# Patient Record
Sex: MALE | Race: WHITE | NOT HISPANIC OR LATINO | Employment: OTHER | URBAN - METROPOLITAN AREA
[De-identification: names, ages, dates, MRNs, and addresses within clinical notes are randomized per-mention and may not be internally consistent; named-entity substitution may affect disease eponyms.]

---

## 2023-10-26 ENCOUNTER — HOSPITAL ENCOUNTER (INPATIENT)
Facility: CLINIC | Age: 78
LOS: 11 days | Discharge: HOME HEALTH | DRG: 872 | End: 2023-11-06
Attending: EMERGENCY MEDICINE | Admitting: STUDENT IN AN ORGANIZED HEALTH CARE EDUCATION/TRAINING PROGRAM
Payer: MEDICARE

## 2023-10-26 ENCOUNTER — APPOINTMENT (OUTPATIENT)
Dept: GENERAL RADIOLOGY | Facility: CLINIC | Age: 78
DRG: 872 | End: 2023-10-26
Attending: EMERGENCY MEDICINE
Payer: MEDICARE

## 2023-10-26 DIAGNOSIS — F43.23 ADJUSTMENT REACTION WITH ANXIETY AND DEPRESSION: ICD-10-CM

## 2023-10-26 DIAGNOSIS — A49.9 UTI (URINARY TRACT INFECTION), BACTERIAL: ICD-10-CM

## 2023-10-26 DIAGNOSIS — N39.0 UTI (URINARY TRACT INFECTION), BACTERIAL: ICD-10-CM

## 2023-10-26 DIAGNOSIS — F02.80 ALZHEIMER'S DISEASE (H): ICD-10-CM

## 2023-10-26 DIAGNOSIS — J30.2 SEASONAL ALLERGIC RHINITIS, UNSPECIFIED TRIGGER: ICD-10-CM

## 2023-10-26 DIAGNOSIS — G30.9 ALZHEIMER'S DISEASE (H): ICD-10-CM

## 2023-10-26 DIAGNOSIS — L30.4 INTERTRIGO: Primary | ICD-10-CM

## 2023-10-26 DIAGNOSIS — R53.1 WEAKNESS GENERALIZED: ICD-10-CM

## 2023-10-26 LAB
ALBUMIN SERPL BCG-MCNC: 4 G/DL (ref 3.5–5.2)
ALBUMIN UR-MCNC: 100 MG/DL
ALP SERPL-CCNC: 68 U/L (ref 40–129)
ALT SERPL W P-5'-P-CCNC: 36 U/L (ref 0–70)
ANION GAP SERPL CALCULATED.3IONS-SCNC: 13 MMOL/L (ref 7–15)
APPEARANCE UR: ABNORMAL
AST SERPL W P-5'-P-CCNC: 104 U/L (ref 0–45)
BACTERIA #/AREA URNS HPF: ABNORMAL /HPF
BASOPHILS # BLD AUTO: 0 10E3/UL (ref 0–0.2)
BASOPHILS NFR BLD AUTO: 0 %
BILIRUB SERPL-MCNC: 1 MG/DL
BILIRUB UR QL STRIP: NEGATIVE
BUN SERPL-MCNC: 21.5 MG/DL (ref 8–23)
CALCIUM SERPL-MCNC: 8.9 MG/DL (ref 8.8–10.2)
CHLORIDE SERPL-SCNC: 102 MMOL/L (ref 98–107)
COLOR UR AUTO: YELLOW
CREAT SERPL-MCNC: 1.03 MG/DL (ref 0.67–1.17)
DEPRECATED HCO3 PLAS-SCNC: 20 MMOL/L (ref 22–29)
EGFRCR SERPLBLD CKD-EPI 2021: 74 ML/MIN/1.73M2
EOSINOPHIL # BLD AUTO: 0 10E3/UL (ref 0–0.7)
EOSINOPHIL NFR BLD AUTO: 0 %
ERYTHROCYTE [DISTWIDTH] IN BLOOD BY AUTOMATED COUNT: 13 % (ref 10–15)
FLUAV RNA SPEC QL NAA+PROBE: NEGATIVE
FLUBV RNA RESP QL NAA+PROBE: NEGATIVE
GLUCOSE SERPL-MCNC: 172 MG/DL (ref 70–99)
GLUCOSE UR STRIP-MCNC: NEGATIVE MG/DL
HCO3 BLDV-SCNC: 20 MMOL/L (ref 21–28)
HCT VFR BLD AUTO: 39.2 % (ref 40–53)
HGB BLD-MCNC: 13 G/DL (ref 13.3–17.7)
HGB UR QL STRIP: ABNORMAL
HOLD SPECIMEN: NORMAL
HYALINE CASTS: 5 /LPF
IMM GRANULOCYTES # BLD: 0.2 10E3/UL
IMM GRANULOCYTES NFR BLD: 1 %
KETONES UR STRIP-MCNC: ABNORMAL MG/DL
LACTATE BLD-SCNC: 3 MMOL/L
LACTATE SERPL-SCNC: 1 MMOL/L (ref 0.7–2)
LEUKOCYTE ESTERASE UR QL STRIP: ABNORMAL
LYMPHOCYTES # BLD AUTO: 1.2 10E3/UL (ref 0.8–5.3)
LYMPHOCYTES NFR BLD AUTO: 6 %
MCH RBC QN AUTO: 31.5 PG (ref 26.5–33)
MCHC RBC AUTO-ENTMCNC: 33.2 G/DL (ref 31.5–36.5)
MCV RBC AUTO: 95 FL (ref 78–100)
MONOCYTES # BLD AUTO: 1.4 10E3/UL (ref 0–1.3)
MONOCYTES NFR BLD AUTO: 7 %
MUCOUS THREADS #/AREA URNS LPF: PRESENT /LPF
NEUTROPHILS # BLD AUTO: 16.6 10E3/UL (ref 1.6–8.3)
NEUTROPHILS NFR BLD AUTO: 86 %
NITRATE UR QL: NEGATIVE
NRBC # BLD AUTO: 0 10E3/UL
NRBC BLD AUTO-RTO: 0 /100
PCO2 BLDV: 28 MM HG (ref 40–50)
PH BLDV: 7.46 [PH] (ref 7.32–7.43)
PH UR STRIP: 6 [PH] (ref 5–7)
PLATELET # BLD AUTO: 155 10E3/UL (ref 150–450)
PO2 BLDV: 41 MM HG (ref 25–47)
POTASSIUM SERPL-SCNC: 3.8 MMOL/L (ref 3.4–5.3)
PROT SERPL-MCNC: 6.6 G/DL (ref 6.4–8.3)
RBC # BLD AUTO: 4.13 10E6/UL (ref 4.4–5.9)
RBC URINE: 8 /HPF
RSV RNA SPEC NAA+PROBE: NEGATIVE
SAO2 % BLDV: 80 % (ref 94–100)
SARS-COV-2 RNA RESP QL NAA+PROBE: NEGATIVE
SODIUM SERPL-SCNC: 135 MMOL/L (ref 135–145)
SP GR UR STRIP: 1.02 (ref 1–1.03)
SPERM #/AREA URNS HPF: PRESENT /HPF
SQUAMOUS EPITHELIAL: 1 /HPF
UROBILINOGEN UR STRIP-MCNC: NORMAL MG/DL
WBC # BLD AUTO: 19.5 10E3/UL (ref 4–11)
WBC URINE: >182 /HPF

## 2023-10-26 PROCEDURE — 250N000011 HC RX IP 250 OP 636: Mod: JZ | Performed by: EMERGENCY MEDICINE

## 2023-10-26 PROCEDURE — 80053 COMPREHEN METABOLIC PANEL: CPT | Performed by: EMERGENCY MEDICINE

## 2023-10-26 PROCEDURE — 87637 SARSCOV2&INF A&B&RSV AMP PRB: CPT | Performed by: EMERGENCY MEDICINE

## 2023-10-26 PROCEDURE — 99285 EMERGENCY DEPT VISIT HI MDM: CPT | Mod: 25

## 2023-10-26 PROCEDURE — 120N000001 HC R&B MED SURG/OB

## 2023-10-26 PROCEDURE — 83605 ASSAY OF LACTIC ACID: CPT

## 2023-10-26 PROCEDURE — 93005 ELECTROCARDIOGRAM TRACING: CPT

## 2023-10-26 PROCEDURE — 85025 COMPLETE CBC W/AUTO DIFF WBC: CPT | Performed by: EMERGENCY MEDICINE

## 2023-10-26 PROCEDURE — 250N000013 HC RX MED GY IP 250 OP 250 PS 637: Performed by: STUDENT IN AN ORGANIZED HEALTH CARE EDUCATION/TRAINING PROGRAM

## 2023-10-26 PROCEDURE — 36415 COLL VENOUS BLD VENIPUNCTURE: CPT

## 2023-10-26 PROCEDURE — 81001 URINALYSIS AUTO W/SCOPE: CPT | Performed by: EMERGENCY MEDICINE

## 2023-10-26 PROCEDURE — 87040 BLOOD CULTURE FOR BACTERIA: CPT | Performed by: EMERGENCY MEDICINE

## 2023-10-26 PROCEDURE — 36415 COLL VENOUS BLD VENIPUNCTURE: CPT | Performed by: EMERGENCY MEDICINE

## 2023-10-26 PROCEDURE — 82803 BLOOD GASES ANY COMBINATION: CPT

## 2023-10-26 PROCEDURE — 87086 URINE CULTURE/COLONY COUNT: CPT | Performed by: EMERGENCY MEDICINE

## 2023-10-26 PROCEDURE — 250N000013 HC RX MED GY IP 250 OP 250 PS 637: Performed by: EMERGENCY MEDICINE

## 2023-10-26 PROCEDURE — 250N000011 HC RX IP 250 OP 636: Performed by: STUDENT IN AN ORGANIZED HEALTH CARE EDUCATION/TRAINING PROGRAM

## 2023-10-26 PROCEDURE — 99223 1ST HOSP IP/OBS HIGH 75: CPT | Mod: AI | Performed by: STUDENT IN AN ORGANIZED HEALTH CARE EDUCATION/TRAINING PROGRAM

## 2023-10-26 PROCEDURE — 258N000003 HC RX IP 258 OP 636: Performed by: STUDENT IN AN ORGANIZED HEALTH CARE EDUCATION/TRAINING PROGRAM

## 2023-10-26 PROCEDURE — 71046 X-RAY EXAM CHEST 2 VIEWS: CPT

## 2023-10-26 RX ORDER — ACETAMINOPHEN 325 MG/1
975 TABLET ORAL ONCE
Status: COMPLETED | OUTPATIENT
Start: 2023-10-26 | End: 2023-10-26

## 2023-10-26 RX ORDER — SODIUM CHLORIDE, SODIUM LACTATE, POTASSIUM CHLORIDE, CALCIUM CHLORIDE 600; 310; 30; 20 MG/100ML; MG/100ML; MG/100ML; MG/100ML
INJECTION, SOLUTION INTRAVENOUS CONTINUOUS
Status: DISCONTINUED | OUTPATIENT
Start: 2023-10-26 | End: 2023-10-27

## 2023-10-26 RX ORDER — ONDANSETRON 4 MG/1
4 TABLET, ORALLY DISINTEGRATING ORAL EVERY 6 HOURS PRN
Status: DISCONTINUED | OUTPATIENT
Start: 2023-10-26 | End: 2023-11-06 | Stop reason: HOSPADM

## 2023-10-26 RX ORDER — AMOXICILLIN 250 MG
2 CAPSULE ORAL 2 TIMES DAILY PRN
Status: DISCONTINUED | OUTPATIENT
Start: 2023-10-26 | End: 2023-11-06 | Stop reason: HOSPADM

## 2023-10-26 RX ORDER — QUETIAPINE FUMARATE 25 MG/1
25 TABLET, FILM COATED ORAL AT BEDTIME
Status: ON HOLD | COMMUNITY
End: 2023-11-06

## 2023-10-26 RX ORDER — CETIRIZINE HYDROCHLORIDE 10 MG/1
10 TABLET ORAL DAILY
Status: ON HOLD | COMMUNITY
End: 2023-11-06

## 2023-10-26 RX ORDER — CEFTRIAXONE 1 G/1
1 INJECTION, POWDER, FOR SOLUTION INTRAMUSCULAR; INTRAVENOUS EVERY 24 HOURS
Status: DISCONTINUED | OUTPATIENT
Start: 2023-10-27 | End: 2023-10-27

## 2023-10-26 RX ORDER — AMOXICILLIN 250 MG
1 CAPSULE ORAL 2 TIMES DAILY PRN
Status: DISCONTINUED | OUTPATIENT
Start: 2023-10-26 | End: 2023-11-06 | Stop reason: HOSPADM

## 2023-10-26 RX ORDER — HEPARIN SODIUM 5000 [USP'U]/.5ML
5000 INJECTION, SOLUTION INTRAVENOUS; SUBCUTANEOUS EVERY 12 HOURS
Status: DISCONTINUED | OUTPATIENT
Start: 2023-10-26 | End: 2023-11-06 | Stop reason: HOSPADM

## 2023-10-26 RX ORDER — CEFTRIAXONE 1 G/1
1 INJECTION, POWDER, FOR SOLUTION INTRAMUSCULAR; INTRAVENOUS ONCE
Status: COMPLETED | OUTPATIENT
Start: 2023-10-26 | End: 2023-10-26

## 2023-10-26 RX ORDER — SODIUM CHLORIDE, SODIUM LACTATE, POTASSIUM CHLORIDE, CALCIUM CHLORIDE 600; 310; 30; 20 MG/100ML; MG/100ML; MG/100ML; MG/100ML
INJECTION, SOLUTION INTRAVENOUS CONTINUOUS
Status: DISCONTINUED | OUTPATIENT
Start: 2023-10-26 | End: 2023-10-26

## 2023-10-26 RX ORDER — QUETIAPINE FUMARATE 25 MG/1
25 TABLET, FILM COATED ORAL 2 TIMES DAILY
Status: DISCONTINUED | OUTPATIENT
Start: 2023-10-26 | End: 2023-10-27

## 2023-10-26 RX ORDER — ACETAMINOPHEN 650 MG/1
650 SUPPOSITORY RECTAL EVERY 6 HOURS PRN
Status: DISCONTINUED | OUTPATIENT
Start: 2023-10-26 | End: 2023-11-06 | Stop reason: HOSPADM

## 2023-10-26 RX ORDER — ACETAMINOPHEN 325 MG/1
650 TABLET ORAL EVERY 6 HOURS PRN
Status: DISCONTINUED | OUTPATIENT
Start: 2023-10-26 | End: 2023-11-06 | Stop reason: HOSPADM

## 2023-10-26 RX ORDER — ONDANSETRON 2 MG/ML
4 INJECTION INTRAMUSCULAR; INTRAVENOUS EVERY 6 HOURS PRN
Status: DISCONTINUED | OUTPATIENT
Start: 2023-10-26 | End: 2023-11-06 | Stop reason: HOSPADM

## 2023-10-26 RX ADMIN — CEFTRIAXONE 1 G: 1 INJECTION, POWDER, FOR SOLUTION INTRAMUSCULAR; INTRAVENOUS at 21:00

## 2023-10-26 RX ADMIN — ACETAMINOPHEN 975 MG: 325 TABLET, FILM COATED ORAL at 19:02

## 2023-10-26 RX ADMIN — QUETIAPINE FUMARATE 25 MG: 25 TABLET ORAL at 22:08

## 2023-10-26 RX ADMIN — SODIUM CHLORIDE 1000 ML: 9 INJECTION, SOLUTION INTRAVENOUS at 23:54

## 2023-10-26 RX ADMIN — HEPARIN SODIUM 5000 UNITS: 5000 INJECTION, SOLUTION INTRAVENOUS; SUBCUTANEOUS at 22:08

## 2023-10-26 RX ADMIN — SODIUM CHLORIDE 1000 ML: 9 INJECTION, SOLUTION INTRAVENOUS at 21:49

## 2023-10-26 ASSESSMENT — ACTIVITIES OF DAILY LIVING (ADL)
ADLS_ACUITY_SCORE: 35
ADLS_ACUITY_SCORE: 35
ADLS_ACUITY_SCORE: 41

## 2023-10-26 NOTE — LETTER
I will send progress notes in a separate fax.      DISHA Flores, [facility]  Inpatient Care Coordination  [facility]  [phone]

## 2023-10-26 NOTE — ED TRIAGE NOTES
Arrives via EMS for weakness possible UTI pt febrile hx dementia family was going to bring him in but had trouble getting him into the car and called ems. Pt denies pain. Alert to self. VSS. Fever 101.9     Triage Assessment (Adult)       Row Name 10/26/23 2904          Triage Assessment    Airway WDL WDL     Additional Documentation [place] Coma Scale (Group)        Respiratory WDL    Respiratory WDL WDL        Cognitive/Neuro/Behavioral WDL    Cognitive/Neuro/Behavioral WDL orientation     Orientation disoriented to;time;place;situation        [place] Coma Scale    Best Eye Response 4-->(E4) spontaneous     Best Motor Response 6-->(M6) obeys commands     Best Verbal Response 4-->(V4) confused     [place] Coma Scale Score 14

## 2023-10-26 NOTE — ED PROVIDER NOTES
History     Chief Complaint:  Altered Mental Status     The history is provided by the patient and the EMS personnel. The history is limited by the condition of the patient.      Jamie Meyer is a 77 year old male with history of Alzheimer's disease who presents to the ED via EMS for evaluation of altered mental status. RN reports Jamie' family was concerned due to increasing confusion over the last couple of days and intended to present to the ED via private car but had difficulty getting the patient into the car, prompting them to call EMS. Family notes he slipped out of a chair last night. O2 90% on room air. Febrile upon ED arrival at 101.9. Jamie denies pain or bowel or bladder symptoms.     Independent Historian:   RN per EMS    Review of External Notes:       Medications:    Zoloft  Seroquel    Past Medical History:    Late onset Alzheimer's disease with behavioral disturbance  Dyslipidemia    Past Surgical History:    Hernia repair  Prostate surgery    Physical Exam   Patient Vitals for the past 24 hrs:   BP Temp Temp src Pulse Resp SpO2   10/26/23 1920 -- -- -- 92 -- 92 %   10/26/23 1902 -- 99.4  F (37.4  C) -- -- -- 93 %   10/26/23 1821 -- -- -- 114 -- --   10/26/23 1813 128/75 (!) 101.9  F (38.8  C) Oral 65 20 90 %      Physical Exam  Vitals reviewed.   Cardiovascular:      Rate and Rhythm: Normal rate.   Pulmonary:      Effort: Pulmonary effort is normal.   Abdominal:      Palpations: Abdomen is soft.   Musculoskeletal:      Cervical back: Normal range of motion.   Skin:     General: Skin is warm.   Neurological:      Mental Status: He is alert. Mental status is at baseline.      GCS: GCS eye subscore is 4. GCS verbal subscore is 4. GCS motor subscore is 6.   Psychiatric:         Mood and Affect: Mood normal.           Emergency Department Course   ECG  ECG taken at 1813, ECG read at 1816  Sinus rhythm with frequent premature ventricular complexes  Anterior infarct, age undetermined  Abnormal ECG    Rate 99 bpm. NM interval 198 ms. QRS duration 112 ms. QT/QTc 380/487 ms. P-R-T axes 67 1 76.     Imaging:  CT Abdomen Pelvis w/o Contrast   Final Result   IMPRESSION:    1.  No hydronephrosis or urolithiasis.   2.  No acute process demonstrated.         Chest XR,  PA & LAT   Final Result   IMPRESSION: Enlargement of the cardiac silhouette. Bibasilar opacities, differential includes atelectasis and developing pneumonia. Possible small bilateral pleural effusions. No pneumothorax. No acute bony abnormality.         Report per radiology    Laboratory:  Labs Ordered and Resulted from Time of ED Arrival to Time of ED Departure   ROUTINE UA WITH MICROSCOPIC REFLEX TO CULTURE - Abnormal       Result Value    Color Urine Yellow      Appearance Urine Slightly Cloudy (*)     Glucose Urine Negative      Bilirubin Urine Negative      Ketones Urine Trace (*)     Specific Gravity Urine 1.024      Blood Urine Large (*)     pH Urine 6.0      Protein Albumin Urine 100 (*)     Urobilinogen Urine Normal      Nitrite Urine Negative      Leukocyte Esterase Urine Large (*)     Bacteria Urine Many (*)     Mucus Urine Present (*)     Sperm Urine Present (*)     RBC Urine 8 (*)     WBC Urine >182 (*)     Squamous Epithelials Urine 1      Hyaline Casts Urine 5 (*)    COMPREHENSIVE METABOLIC PANEL - Abnormal    Sodium 135      Potassium 3.8      Carbon Dioxide (CO2) 20 (*)     Anion Gap 13      Urea Nitrogen 21.5      Creatinine 1.03      GFR Estimate 74      Calcium 8.9      Chloride 102      Glucose 172 (*)     Alkaline Phosphatase 68       (*)     ALT 36      Protein Total 6.6      Albumin 4.0      Bilirubin Total 1.0     CBC WITH PLATELETS AND DIFFERENTIAL - Abnormal    WBC Count 19.5 (*)     RBC Count 4.13 (*)     Hemoglobin 13.0 (*)     Hematocrit 39.2 (*)     MCV 95      MCH 31.5      MCHC 33.2      RDW 13.0      Platelet Count 155      % Neutrophils 86      % Lymphocytes 6      % Monocytes 7      % Eosinophils 0      %  Basophils 0      % Immature Granulocytes 1      NRBCs per 100 WBC 0      Absolute Neutrophils 16.6 (*)     Absolute Lymphocytes 1.2      Absolute Monocytes 1.4 (*)     Absolute Eosinophils 0.0      Absolute Basophils 0.0      Absolute Immature Granulocytes 0.2      Absolute NRBCs 0.0     ISTAT GASES LACTATE VENOUS POCT - Abnormal    Lactic Acid POCT 3.0 (*)     Bicarbonate Venous POCT 20 (*)     O2 Sat, Venous POCT 80 (*)     pCO2 Venous POCT 28 (*)     pH Venous POCT 7.46 (*)     pO2 Venous POCT 41     INFLUENZA A/B, RSV, & SARS-COV2 PCR - Normal    Influenza A PCR Negative      Influenza B PCR Negative      RSV PCR Negative      SARS CoV2 PCR Negative     BLOOD CULTURE   URINE CULTURE     Procedures   None    Emergency Department Course & Assessments:    Interventions:  Medications   cefTRIAXone (ROCEPHIN) 1 g vial to attach to  mL bag for ADULTS or NS 50 mL bag for PEDS (has no administration in time range)   acetaminophen (TYLENOL) tablet 975 mg (975 mg Oral $Given 10/26/23 1902)     Assessments:  1818 I obtained history and examined the patient as noted above.  1921 I rechecked the patient and explained findings. I spoke with the patient's family as well.    Independent Interpretation (X-rays, CTs, rhythm strip):  None    Consultations/Discussion of Management or Tests:  1947 I spoke with Leana Gamez PA-C for Dr. Carmelo Mcclure DO of the hospitalist team regarding the patient, who accepted the patient for admission.     Social Determinants of Health affecting care:   None    Disposition:  The patient was admitted to the hospital under the care of Dr. Mcclure.     Impression & Plan      Medical Decision Making:  Patient is a 77-year-old male who presents with fever.  And weakness.  Intermittent confusion but has Alzheimer's.  Family notes increased weakness and inability to get out of bed.  Due to infectious encephalopathy and generalized weakness requires admission.  Lab work suggest UTI as the source  blood culture and urine culture pending at the time of admission.    Diagnosis:    ICD-10-CM    1. Weakness generalized  R53.1       2. UTI (urinary tract infection), bacterial  N39.0     A49.9       3. Alzheimer's disease (H)  G30.9     F02.80           Discharge Medications:  New Prescriptions    No medications on file        Scribe Disclosure:  Anton APONTE Hailie, am serving as a scribe at 6:25 PM on 10/26/2023 to document services personally performed by Maycol Briones MD based on my observations and the provider's statements to me.   10/26/2023   Maycol Briones MD Goodman, Brian Samuel, MD  10/29/23 2149

## 2023-10-26 NOTE — LETTER
DISHA Flores, [facility]  Inpatient Care Coordination  [facility]  [phone]

## 2023-10-26 NOTE — LETTER
DISHA Flores, [facility]  Inpatient Care Coordination  [facility]  [phone]

## 2023-10-27 ENCOUNTER — APPOINTMENT (OUTPATIENT)
Dept: PHYSICAL THERAPY | Facility: CLINIC | Age: 78
DRG: 872 | End: 2023-10-27
Attending: STUDENT IN AN ORGANIZED HEALTH CARE EDUCATION/TRAINING PROGRAM
Payer: MEDICARE

## 2023-10-27 LAB
ANION GAP SERPL CALCULATED.3IONS-SCNC: 9 MMOL/L (ref 7–15)
ATRIAL RATE - MUSE: 99 BPM
BUN SERPL-MCNC: 20.6 MG/DL (ref 8–23)
CALCIUM SERPL-MCNC: 8 MG/DL (ref 8.8–10.2)
CHLORIDE SERPL-SCNC: 107 MMOL/L (ref 98–107)
CREAT SERPL-MCNC: 0.9 MG/DL (ref 0.67–1.17)
DEPRECATED HCO3 PLAS-SCNC: 21 MMOL/L (ref 22–29)
DIASTOLIC BLOOD PRESSURE - MUSE: NORMAL MMHG
EGFRCR SERPLBLD CKD-EPI 2021: 87 ML/MIN/1.73M2
ERYTHROCYTE [DISTWIDTH] IN BLOOD BY AUTOMATED COUNT: 13 % (ref 10–15)
GLUCOSE SERPL-MCNC: 136 MG/DL (ref 70–99)
HCT VFR BLD AUTO: 35.5 % (ref 40–53)
HGB BLD-MCNC: 11.7 G/DL (ref 13.3–17.7)
INTERPRETATION ECG - MUSE: NORMAL
MCH RBC QN AUTO: 31.3 PG (ref 26.5–33)
MCHC RBC AUTO-ENTMCNC: 33 G/DL (ref 31.5–36.5)
MCV RBC AUTO: 95 FL (ref 78–100)
P AXIS - MUSE: 67 DEGREES
PLATELET # BLD AUTO: 142 10E3/UL (ref 150–450)
POTASSIUM SERPL-SCNC: 3.9 MMOL/L (ref 3.4–5.3)
PR INTERVAL - MUSE: 198 MS
QRS DURATION - MUSE: 112 MS
QT - MUSE: 380 MS
QTC - MUSE: 487 MS
R AXIS - MUSE: 1 DEGREES
RBC # BLD AUTO: 3.74 10E6/UL (ref 4.4–5.9)
SODIUM SERPL-SCNC: 137 MMOL/L (ref 135–145)
SYSTOLIC BLOOD PRESSURE - MUSE: NORMAL MMHG
T AXIS - MUSE: 76 DEGREES
VENTRICULAR RATE- MUSE: 99 BPM
WBC # BLD AUTO: 14.5 10E3/UL (ref 4–11)

## 2023-10-27 PROCEDURE — 250N000013 HC RX MED GY IP 250 OP 250 PS 637: Performed by: STUDENT IN AN ORGANIZED HEALTH CARE EDUCATION/TRAINING PROGRAM

## 2023-10-27 PROCEDURE — 97162 PT EVAL MOD COMPLEX 30 MIN: CPT | Mod: GP

## 2023-10-27 PROCEDURE — 97530 THERAPEUTIC ACTIVITIES: CPT | Mod: GP

## 2023-10-27 PROCEDURE — 250N000011 HC RX IP 250 OP 636: Performed by: STUDENT IN AN ORGANIZED HEALTH CARE EDUCATION/TRAINING PROGRAM

## 2023-10-27 PROCEDURE — 85027 COMPLETE CBC AUTOMATED: CPT | Performed by: STUDENT IN AN ORGANIZED HEALTH CARE EDUCATION/TRAINING PROGRAM

## 2023-10-27 PROCEDURE — 250N000011 HC RX IP 250 OP 636: Mod: JZ | Performed by: INTERNAL MEDICINE

## 2023-10-27 PROCEDURE — 80048 BASIC METABOLIC PNL TOTAL CA: CPT | Performed by: STUDENT IN AN ORGANIZED HEALTH CARE EDUCATION/TRAINING PROGRAM

## 2023-10-27 PROCEDURE — 258N000003 HC RX IP 258 OP 636: Performed by: STUDENT IN AN ORGANIZED HEALTH CARE EDUCATION/TRAINING PROGRAM

## 2023-10-27 PROCEDURE — 250N000013 HC RX MED GY IP 250 OP 250 PS 637: Performed by: INTERNAL MEDICINE

## 2023-10-27 PROCEDURE — 99233 SBSQ HOSP IP/OBS HIGH 50: CPT | Performed by: INTERNAL MEDICINE

## 2023-10-27 PROCEDURE — 120N000001 HC R&B MED SURG/OB

## 2023-10-27 PROCEDURE — 36415 COLL VENOUS BLD VENIPUNCTURE: CPT | Performed by: STUDENT IN AN ORGANIZED HEALTH CARE EDUCATION/TRAINING PROGRAM

## 2023-10-27 RX ORDER — QUETIAPINE FUMARATE 25 MG/1
25 TABLET, FILM COATED ORAL EVERY 12 HOURS PRN
Status: DISCONTINUED | OUTPATIENT
Start: 2023-10-27 | End: 2023-11-06 | Stop reason: HOSPADM

## 2023-10-27 RX ORDER — ENOXAPARIN SODIUM 100 MG/ML
40 INJECTION SUBCUTANEOUS EVERY 24 HOURS
Status: DISCONTINUED | OUTPATIENT
Start: 2023-10-27 | End: 2023-10-27

## 2023-10-27 RX ORDER — CEFTRIAXONE 2 G/1
2 INJECTION, POWDER, FOR SOLUTION INTRAMUSCULAR; INTRAVENOUS EVERY 24 HOURS
Status: DISCONTINUED | OUTPATIENT
Start: 2023-10-27 | End: 2023-11-05

## 2023-10-27 RX ORDER — QUETIAPINE FUMARATE 25 MG/1
25 TABLET, FILM COATED ORAL AT BEDTIME
Status: DISCONTINUED | OUTPATIENT
Start: 2023-10-27 | End: 2023-11-01

## 2023-10-27 RX ADMIN — HEPARIN SODIUM 5000 UNITS: 5000 INJECTION, SOLUTION INTRAVENOUS; SUBCUTANEOUS at 10:19

## 2023-10-27 RX ADMIN — QUETIAPINE FUMARATE 25 MG: 25 TABLET ORAL at 09:01

## 2023-10-27 RX ADMIN — HEPARIN SODIUM 5000 UNITS: 5000 INJECTION, SOLUTION INTRAVENOUS; SUBCUTANEOUS at 22:45

## 2023-10-27 RX ADMIN — QUETIAPINE FUMARATE 25 MG: 25 TABLET ORAL at 22:48

## 2023-10-27 RX ADMIN — CEFTRIAXONE 2 G: 2 INJECTION, POWDER, FOR SOLUTION INTRAMUSCULAR; INTRAVENOUS at 15:26

## 2023-10-27 RX ADMIN — QUETIAPINE FUMARATE 25 MG: 25 TABLET ORAL at 15:58

## 2023-10-27 RX ADMIN — SODIUM CHLORIDE, POTASSIUM CHLORIDE, SODIUM LACTATE AND CALCIUM CHLORIDE: 600; 310; 30; 20 INJECTION, SOLUTION INTRAVENOUS at 01:59

## 2023-10-27 ASSESSMENT — ACTIVITIES OF DAILY LIVING (ADL)
ADLS_ACUITY_SCORE: 47
ADLS_ACUITY_SCORE: 43
ADLS_ACUITY_SCORE: 41
ADLS_ACUITY_SCORE: 47
ADLS_ACUITY_SCORE: 41
ADLS_ACUITY_SCORE: 41
ADLS_ACUITY_SCORE: 47
ADLS_ACUITY_SCORE: 41

## 2023-10-27 NOTE — PLAN OF CARE
Goal Outcome Evaluation:      Plan of Care Reviewed With: patient    Overall Patient Progress: improving    Pt arrived to the unit via cart. Pt Alert oriented to self. Pleasantly confused. VSS RA. Denies pain. A febrile this shift. Received 2l of IVF bolus. LR running as MIVF at 100ml/hr. Pt incontinent of bladder PVR: 344ml straight cathed pt for 350ml x1 this shift. External catheter place. Pt on the K protocol lab scheduled this am. Lactic acid gone down to 1.0. receiving iv rocephin. Redness to bilateral eye noted. Generalized weakness present pt not out of bed this shift. No BM noted this shift.

## 2023-10-27 NOTE — PHARMACY-ADMISSION MEDICATION HISTORY
Pharmacist Admission Medication History    Admission medication history is complete. The information provided in this note is only as accurate as the sources available at the time of the update.    Information Source(s):  Spouse (Jackie)  via phone    Changes made to PTA medication list:  Added: all  Deleted: None  Changed: None    Prior to Admission medications    Medication Sig Last Dose Taking? Auth Provider Long Term End Date   cetirizine (ZYRTEC) 10 MG tablet Take 10 mg by mouth daily 10/26/2023 at am Yes Unknown, Entered By History     QUEtiapine (SEROQUEL) 25 MG tablet Take 25 mg by mouth at bedtime 10/25/2023 Yes Unknown, Entered By History     sertraline (ZOLOFT) 50 MG tablet Take 50 mg by mouth daily 10/26/2023 at am Yes Unknown, Entered By History Yes        Allergies reviewed with patient and updates made in EHR: no    Medication History Completed By: Niall Kan RPH 10/26/2023 11:02 PM    Pharmacy reviewed prior to admission med list from pre-admitting rn.

## 2023-10-27 NOTE — PLAN OF CARE
Goal Outcome Evaluation:      Plan of Care Reviewed With: spouse    Overall Patient Progress: no changeOverall Patient Progress: no change     Admitting Diagnosis: AMS, UTI Date 10/26/23  PMH: Alzheimer  Orientation: A/Ox1 (self)    Vitals/Tele: Stable  Resp: LS Clear/ diminished RA    Pain: Denies  IV Access/drains: PIV x 2 SL  Diet:Regular needs help ordering and feeding  Labs/ Glucose: K+ 3.9 Redraw in AM. WBC 14.5 Hgb 11.7  Mobility:    GI/: Denies Nausea, vomiting, or abd pain. LBM Unknown  Voiding Incontinent of urine. Tried external cath but patient keep pulling them off. Wife stated that he normally wears pullup and this is why he is picking at things. Place brief on patient is more comfortable   Wound/Skin: Scatted bruising    Consults:PT, OT SW   Summary/Discharge Plan:Continue with IV Abx. Patient lives with his wife and is the primary care taker. PRN seroquel for agitation given.  Continue to monitor        See Flow sheets for assessment

## 2023-10-27 NOTE — ED NOTES
[facility]  ED Nurse Handoff Report    ED Chief complaint: Altered Mental Status  . ED Diagnosis:   Final diagnoses:   Weakness generalized   UTI (urinary tract infection), bacterial   Alzheimer's disease (H)       Allergies: No Known Allergies    Code Status: Full Code - no code status on file     Activity level - Baseline/Home:  ambulatory around the house per family - chaidez   Activity Level - Current:   assist of 1.   Lift room needed: No.   Bariatric: No   Needed: No   Isolation:special precaution .   Infection: Not Applicable.     Respiratory status: Room air    Vital Signs (within 30 minutes):   Vitals:    10/26/23 1821 10/26/23 1902 10/26/23 1920 10/26/23 2040   BP:    100/74   Pulse: 114  92 63   Resp:       Temp:  99.4  F (37.4  C)     TempSrc:       SpO2:  93% 92% 93%       Cardiac Rhythm:  ,      Pain level:    Patient confused: Yes. - baseline alzheimer   Patient Falls Risk: assistive device/personal items within reach, activity supervised, mobility aid in reach, room door open, and toileting schedule implemented, arm band inplaced.   Elimination Status: Has voided - incontinent     Patient Report - Initial Complaint: weakness .   Focused Assessment: The history is provided by the patient and the EMS personnel. The history is limited by the condition of the patient.      Jamie Meyer is a 77 year old male with history of Alzheimer's disease who presents to the ED via EMS for evaluation of altered mental status. RN reports Jamie' family was concerned due to increasing confusion over the last couple of days and intended to present to the ED via private car but had difficulty getting the patient into the car, prompting them to call EMS. Family notes he slipped out of a chair last night. O2 90% on room air. Febrile upon ED arrival at 101.9. Jamie denies pain or bowel or bladder symptoms.      Abnormal Results:   Labs Ordered and Resulted from Time of ED Arrival to Time  of ED Departure   ROUTINE UA WITH MICROSCOPIC REFLEX TO CULTURE - Abnormal       Result Value    Color Urine Yellow      Appearance Urine Slightly Cloudy (*)     Glucose Urine Negative      Bilirubin Urine Negative      Ketones Urine Trace (*)     Specific Gravity Urine 1.024      Blood Urine Large (*)     pH Urine 6.0      Protein Albumin Urine 100 (*)     Urobilinogen Urine Normal      Nitrite Urine Negative      Leukocyte Esterase Urine Large (*)     Bacteria Urine Many (*)     Mucus Urine Present (*)     Sperm Urine Present (*)     RBC Urine 8 (*)     WBC Urine >182 (*)     Squamous Epithelials Urine 1      Hyaline Casts Urine 5 (*)    COMPREHENSIVE METABOLIC PANEL - Abnormal    Sodium 135      Potassium 3.8      Carbon Dioxide (CO2) 20 (*)     Anion Gap 13      Urea Nitrogen 21.5      Creatinine 1.03      GFR Estimate 74      Calcium 8.9      Chloride 102      Glucose 172 (*)     Alkaline Phosphatase 68       (*)     ALT 36      Protein Total 6.6      Albumin 4.0      Bilirubin Total 1.0     CBC WITH PLATELETS AND DIFFERENTIAL - Abnormal    WBC Count 19.5 (*)     RBC Count 4.13 (*)     Hemoglobin 13.0 (*)     Hematocrit 39.2 (*)     MCV 95      MCH 31.5      MCHC 33.2      RDW 13.0      Platelet Count 155      % Neutrophils 86      % Lymphocytes 6      % Monocytes 7      % Eosinophils 0      % Basophils 0      % Immature Granulocytes 1      NRBCs per 100 WBC 0      Absolute Neutrophils 16.6 (*)     Absolute Lymphocytes 1.2      Absolute Monocytes 1.4 (*)     Absolute Eosinophils 0.0      Absolute Basophils 0.0      Absolute Immature Granulocytes 0.2      Absolute NRBCs 0.0     ISTAT GASES LACTATE VENOUS POCT - Abnormal    Lactic Acid POCT 3.0 (*)     Bicarbonate Venous POCT 20 (*)     O2 Sat, Venous POCT 80 (*)     pCO2 Venous POCT 28 (*)     pH Venous POCT 7.46 (*)     pO2 Venous POCT 41     INFLUENZA A/B, RSV, & SARS-COV2 PCR - Normal    Influenza A PCR Negative      Influenza B PCR Negative      RSV  PCR Negative      SARS CoV2 PCR Negative     LACTIC ACID WHOLE BLOOD   BLOOD CULTURE   URINE CULTURE   BLOOD CULTURE        Chest XR,  PA & LAT   Final Result   IMPRESSION: Enlargement of the cardiac silhouette. Bibasilar opacities, differential includes atelectasis and developing pneumonia. Possible small bilateral pleural effusions. No pneumothorax. No acute bony abnormality.      XR Chest Port 1 View    (Results Pending)       Treatments provided: see MAR   Family Comments: wife at bedside   OBS brochure/video discussed/provided to patient:  N/A  ED Medications:   Medications   cefTRIAXone (ROCEPHIN) 1 g vial to attach to  mL bag for ADULTS or NS 50 mL bag for PEDS (has no administration in time range)   acetaminophen (TYLENOL) tablet 975 mg (975 mg Oral $Given 10/26/23 1902)       Drips infusing:  No  For the majority of the shift this patient was Green.   Interventions performed were straight cath.    Sepsis treatment initiated: No    Cares/treatment/interventions/medications to be completed following ED care: na     ED Nurse Name: Jean Enriquez RN  9:00 PM  RECEIVING UNIT ED HANDOFF REVIEW    Above ED Nurse Handoff Report was reviewed: Yes  Reviewed by: Alvina Unger RN on October 26, 2023 at 9:13 PM

## 2023-10-27 NOTE — PROGRESS NOTES
"   10/27/23 1500   Appointment Info   Signing Clinician's Name / Credentials (PT) Michelle Magaña DPT   Living Environment   People in Home spouse   Current Living Arrangements house   Home Accessibility stairs to enter home   Number of Stairs, Main Entrance greater than 10 stairs  (12)   Stair Railings, Main Entrance railings safe and in good condition;railings on both sides of stairs   Transportation Anticipated family or friend will provide   Living Environment Comments Pt and spouse live within an apt attached to their dtr's family home.   Self-Care   Usual Activity Tolerance fair   Current Activity Tolerance poor   Equipment Currently Used at Home walker, rolling;wheelchair, manual;shower chair;grab bar, toilet;grab bar, tub/shower;raised toilet seat   Fall history within last six months yes   Number of times patient has fallen within last six months 1   Activity/Exercise/Self-Care Comment Spouse reports she assists pt with all ADLs. Pt was able to ambulate short distances with FWW at baseline.   General Information   Onset of Illness/Injury or Date of Surgery 10/26/23   Referring Physician Carmelo Mcclure, DO   Pertinent History of Current Problem (include personal factors and/or comorbidities that impact the POC) \"77M with hx of advanced dementia with behavioral disturbance and ambulatory dysfunction presents with generalized weakness and found to have a fever to 38.8 on admission and WBC of 19.5.  UA concerning for infection and CXR with atelectasis versus pneumonia.     Patient improved from a infection standpoint but generalized weakness and ambulatory dysfunction will likely limit discharge given patient lives with wife and not much additional help at home. Also, unsure how much patient will participate with PT/OT given severe dementia.  \"   Existing Precautions/Restrictions fall   Cognition   Affect/Mental Status (Cognition) confused   Orientation Status (Cognition) oriented to;person   Follows " Commands (Cognition) follows one-step commands;50-74% accuracy;physical/tactile prompts required;repetition of directions required;initiation impaired;increased processing time needed;verbal cues/prompting required   Cognitive Status Comments Very anxious with mobility, spouse reports pt is fearful of falling   Pain Assessment   Patient Currently in Pain No   Integumentary/Edema   Integumentary/Edema Comments age-related integumentary changes   Posture    Posture Forward head position;Protracted shoulders   Range of Motion (ROM)   Range of Motion ROM is WFL   Strength (Manual Muscle Testing)   Strength (Manual Muscle Testing) Able to perform R SLR;Able to perform L SLR;Deficits observed during functional mobility   Strength Comments decreased functional LE strength   Bed Mobility   Comment, (Bed Mobility) maxA sup>sit   Transfers   Comment, (Transfers) unable to attempt today   Gait/Stairs (Locomotion)   Comment, (Gait/Stairs) unable to attempt today   Balance   Balance Comments falls risk, requires assist for safe mobility   Sensory Examination   Sensory Perception Comments BLE light touch intact   Clinical Impression   Criteria for Skilled Therapeutic Intervention Yes, treatment indicated   PT Diagnosis (PT) impaired IND with functional mobility   Influenced by the following impairments imapired strength, balance, activity tolerance, cognitive deficits   Functional limitations due to impairments impaired bed mobility, transfers, ambulation, stairs   Clinical Presentation (PT Evaluation Complexity) evolving   Clinical Presentation Rationale Based on current presentation, PMH,  social support   Clinical Decision Making (Complexity) moderate complexity   Planned Therapy Interventions (PT) balance training;bed mobility training;gait training;home exercise program;patient/family education;stair training;strengthening;transfer training;progressive activity/exercise;home program guidelines;neuromuscular  re-education;motor coordination training   Risk & Benefits of therapy have been explained evaluation/treatment results reviewed;care plan/treatment goals reviewed;risks/benefits reviewed;current/potential barriers reviewed;participants voiced agreement with care plan;participants included;patient;spouse/significant other   PT Total Evaluation Time   PT Eval, Moderate Complexity Minutes (75703) 10   Physical Therapy Goals   PT Frequency 5x/week   PT Predicted Duration/Target Date for Goal Attainment 11/03/23   PT Goals Bed Mobility;Transfers;Gait;Stairs   PT: Bed Mobility Minimal assist;Supine to/from sit   PT: Transfers Minimal assist;Sit to/from stand;Bed to/from chair;Assistive device   PT: Gait Minimal assist;Assistive device;Rolling walker;50 feet   PT: Stairs Minimal assist;Greater than 10 stairs;Rail on both sides   Interventions   Interventions Quick Adds Therapeutic Activity   Therapeutic Activity   Therapeutic Activities: dynamic activities to improve functional performance Minutes (08940) 15   Symptoms Noted During/After Treatment   (anxiety)   Treatment Detail/Skilled Intervention Following eval, pt sat EOB ~5 min with min-modA. Pt impulsively attempting to return to supine despite cues to remain upright, needs assist to stay sitting. Pt fearful and anxious with mobility, reassurance provided. Sit>sup with Giuseppe. MaxA to boost up in bed. Increased time spent discussing discharge recommendations with spouse related to pt's cognitive deficits and already high level of caregiver burden at home. Pt remained supine with alarm armed and needs in reach.   PT Discharge Planning   PT Plan progress bed mobility, trial transfers with Ax2 and SS?   PT Discharge Recommendation (DC Rec) Long term care facility;Transitional Care Facility   PT Rationale for DC Rec Pt currently below reported baseline for mobility. Is typically able to ambulate short distances with FWW, lives with spouse who assists with all cares. Pt  currently requires maxA for sup>sit, session limited as he is very anxious with mobility. If is able to show increased participation and progress with IP PT, TCU could be considered. If pending pt progress, may need to consider LTC facility due to chronic cognitive deficits and need for assist.   PT Brief overview of current status maxA bed mob, rec use of linsey lift with nursing staff   Total Session Time   Timed Code Treatment Minutes 15   Total Session Time (sum of timed and untimed services) 25

## 2023-10-27 NOTE — PROGRESS NOTES
"[facility]  Hospitalist Progress Note     Assessment & Plan     ASSESSMENT    77M with hx of advanced dementia with behavioral disturbance and ambulatory dysfunction presents with generalized weakness and found to have a fever to 38.8 on admission and WBC of 19.5.  UA concerning for infection and CXR with atelectasis versus pneumonia.    Patient improved from a infection standpoint but generalized weakness and ambulatory dysfunction will likely limit discharge given patient lives with wife and not much additional help at home. Also, unsure how much patient will participate with PT/OT given severe dementia.    PLAN    Sepsis  Acute Cystitis  Concern for Community Acquired Pneumonia  -Presents with generalized weakness and found to meet SIRS criteria on adm with fever to 38.8 and WBC of 19.5  -UA concerning for infection, CXR w/ infiltrates vs atelectasis  PLAN  -Ceftriaxone monotherapy started on admission, will hold off on atypical coverage given pt improving with ceftriaxone alone  -Follow-up cultures    Generalized Weakness  Ambulatory Dysfunction  -Wife says that when he gets sick he \"forgets how to use his legs\"  -PT/OT consults    Advanced Dementia with Behavioral Disturbance  -Currently at baseline mental status  -Continue Seroquel 25mg qHS    DVT Prophy  -Heparin    Disposition  -2-3 days to home. Medically ready as soon as tomorrow but very weak. PT/OT consults pending.      Pierce Baires MD    Subjective     Seen at bedside along with wife.  Updated on plan of care.  Cultures no growth to date.  Patient denies pain.        Objective   Blood pressure 101/56, pulse 104, temperature 99.3  F (37.4  C), temperature source Temporal, resp. rate 18, weight 96 kg (211 lb 10.3 oz), SpO2 93%.    PHYSICAL EXAM  General: Pleasantly demented.  CV: RRR.  Lungs: Scattered rhonchi. Nl WOB.  Abd: Non-tender.  Ext: No edema.    LABS AND IMAGING  Reviewed and pertinent results discussed in assessment and plan. "

## 2023-10-27 NOTE — ED NOTES
ERT performed straight catheter on Pt per RN request to obtain a clean urine sample. Procedure performed within hospital sterile protocol. Urine return obtained. Urine sample sent to lab.

## 2023-10-28 ENCOUNTER — APPOINTMENT (OUTPATIENT)
Dept: PHYSICAL THERAPY | Facility: CLINIC | Age: 78
DRG: 872 | End: 2023-10-28
Payer: MEDICARE

## 2023-10-28 LAB
ANION GAP SERPL CALCULATED.3IONS-SCNC: 10 MMOL/L (ref 7–15)
BACTERIA UR CULT: ABNORMAL
BUN SERPL-MCNC: 17.8 MG/DL (ref 8–23)
CALCIUM SERPL-MCNC: 8.3 MG/DL (ref 8.8–10.2)
CHLORIDE SERPL-SCNC: 105 MMOL/L (ref 98–107)
CREAT SERPL-MCNC: 0.8 MG/DL (ref 0.67–1.17)
DEPRECATED HCO3 PLAS-SCNC: 21 MMOL/L (ref 22–29)
EGFRCR SERPLBLD CKD-EPI 2021: >90 ML/MIN/1.73M2
ERYTHROCYTE [DISTWIDTH] IN BLOOD BY AUTOMATED COUNT: 13.1 % (ref 10–15)
GLUCOSE SERPL-MCNC: 117 MG/DL (ref 70–99)
HCT VFR BLD AUTO: 37.5 % (ref 40–53)
HGB BLD-MCNC: 12.5 G/DL (ref 13.3–17.7)
MCH RBC QN AUTO: 31.7 PG (ref 26.5–33)
MCHC RBC AUTO-ENTMCNC: 33.3 G/DL (ref 31.5–36.5)
MCV RBC AUTO: 95 FL (ref 78–100)
PLATELET # BLD AUTO: 147 10E3/UL (ref 150–450)
POTASSIUM SERPL-SCNC: 3.8 MMOL/L (ref 3.4–5.3)
RBC # BLD AUTO: 3.94 10E6/UL (ref 4.4–5.9)
SODIUM SERPL-SCNC: 136 MMOL/L (ref 135–145)
WBC # BLD AUTO: 8.3 10E3/UL (ref 4–11)

## 2023-10-28 PROCEDURE — 120N000001 HC R&B MED SURG/OB

## 2023-10-28 PROCEDURE — 99232 SBSQ HOSP IP/OBS MODERATE 35: CPT | Performed by: INTERNAL MEDICINE

## 2023-10-28 PROCEDURE — 999N000111 HC STATISTIC OT IP EVAL DEFER: Performed by: REHABILITATION PRACTITIONER

## 2023-10-28 PROCEDURE — 250N000013 HC RX MED GY IP 250 OP 250 PS 637: Performed by: INTERNAL MEDICINE

## 2023-10-28 PROCEDURE — 80048 BASIC METABOLIC PNL TOTAL CA: CPT | Performed by: INTERNAL MEDICINE

## 2023-10-28 PROCEDURE — 97530 THERAPEUTIC ACTIVITIES: CPT | Mod: GP

## 2023-10-28 PROCEDURE — 250N000011 HC RX IP 250 OP 636: Mod: JZ | Performed by: INTERNAL MEDICINE

## 2023-10-28 PROCEDURE — 250N000011 HC RX IP 250 OP 636: Performed by: STUDENT IN AN ORGANIZED HEALTH CARE EDUCATION/TRAINING PROGRAM

## 2023-10-28 PROCEDURE — 36415 COLL VENOUS BLD VENIPUNCTURE: CPT | Performed by: INTERNAL MEDICINE

## 2023-10-28 PROCEDURE — 85027 COMPLETE CBC AUTOMATED: CPT | Performed by: INTERNAL MEDICINE

## 2023-10-28 PROCEDURE — 250N000013 HC RX MED GY IP 250 OP 250 PS 637: Performed by: STUDENT IN AN ORGANIZED HEALTH CARE EDUCATION/TRAINING PROGRAM

## 2023-10-28 RX ORDER — CETIRIZINE HYDROCHLORIDE 10 MG/1
10 TABLET ORAL DAILY
Status: DISCONTINUED | OUTPATIENT
Start: 2023-10-28 | End: 2023-11-06 | Stop reason: HOSPADM

## 2023-10-28 RX ORDER — QUETIAPINE FUMARATE 25 MG/1
25 TABLET, FILM COATED ORAL AT BEDTIME
Status: DISCONTINUED | OUTPATIENT
Start: 2023-10-28 | End: 2023-10-28

## 2023-10-28 RX ADMIN — CETIRIZINE HYDROCHLORIDE 10 MG: 10 TABLET, FILM COATED ORAL at 12:45

## 2023-10-28 RX ADMIN — ACETAMINOPHEN 650 MG: 325 TABLET, FILM COATED ORAL at 09:18

## 2023-10-28 RX ADMIN — HEPARIN SODIUM 5000 UNITS: 5000 INJECTION, SOLUTION INTRAVENOUS; SUBCUTANEOUS at 09:18

## 2023-10-28 RX ADMIN — HEPARIN SODIUM 5000 UNITS: 5000 INJECTION, SOLUTION INTRAVENOUS; SUBCUTANEOUS at 21:37

## 2023-10-28 RX ADMIN — SENNOSIDES AND DOCUSATE SODIUM 1 TABLET: 8.6; 5 TABLET ORAL at 14:20

## 2023-10-28 RX ADMIN — QUETIAPINE FUMARATE 25 MG: 25 TABLET ORAL at 21:37

## 2023-10-28 RX ADMIN — SERTRALINE HYDROCHLORIDE 50 MG: 50 TABLET ORAL at 12:45

## 2023-10-28 RX ADMIN — ACETAMINOPHEN 650 MG: 325 TABLET, FILM COATED ORAL at 14:20

## 2023-10-28 RX ADMIN — CEFTRIAXONE 2 G: 2 INJECTION, POWDER, FOR SOLUTION INTRAMUSCULAR; INTRAVENOUS at 13:43

## 2023-10-28 RX ADMIN — QUETIAPINE FUMARATE 25 MG: 25 TABLET ORAL at 09:18

## 2023-10-28 ASSESSMENT — ACTIVITIES OF DAILY LIVING (ADL)
ADLS_ACUITY_SCORE: 47

## 2023-10-28 NOTE — PROGRESS NOTES
"CLINICAL NUTRITION SERVICES - ASSESSMENT NOTE     Nutrition Prescription    RECOMMENDATIONS FOR MDs/PROVIDERS TO ORDER:  None    Malnutrition Status:    Patient does not meet criteria yet is at risk    Recommendations already ordered by Registered Dietitian (RD):  Gelatein Plus TID w/ meals  Added assist with feeding as comment in diet order to promote PO intake    Future/Additional Recommendations:  Adjust supplements pending PO intake/pt preference       REASON FOR ASSESSMENT  Jamie Meyer is a/an 77 year old male assessed by the dietitian for Admission Nutrition Risk Screen for positive    Patient presents with sepsis secondary to UTI, Alzheimer's disease, HLD, acute cystitis, ambulatory dysfunction, advanced dementia w/ behavioral disturbance    NUTRITION HISTORY  Per pt's wife, pt has a good appetite and has gained weight since moving here from [place]. Pt now has a belly when he did not before and she thinks this has something to do with him being more sedentary. Per chart notes, pt needs help with meals. NKFA. Stated -200lb. Pt does not take nutrition supplements.     CURRENT NUTRITION ORDERS  Diet: Regular  Intake/Tolerance: Pt eating 75% of meals per nursing records and receiving on average 1968 kcals and 83g protein daily.     LABS  Labs reviewed: ast 104, hgb 12.5, hematocrit 27.5, rbc 3.94    MEDICATIONS  Medications reviewed: rocephin, senokot-s/pericolace    ANTHROPOMETRICS  Height: 184.2 cm (6' .52\")  Most Recent Weight: 98 kg (216 lb 0.8 oz)    IBW: 78.8 kg  BMI: Overweight BMI 25-29.9  Weight History:   Wt Readings from Last 30 Encounters:   10/27/23 98 kg (216 lb 0.8 oz)   05/08/23    100.5 kg (221 lb 8 oz)          2.5% wt loss 5-6 months  10/12/2022  97.1 kg (214 lb)     Dosing Weight: 98 kg and 83.6 kg ABW    ASSESSED NUTRITION NEEDS  Estimated Energy Needs: 3296-7862 kcals/day ([place] [employer])  Justification: Maintenance and Overweight  Estimated Protein Needs: 100-125 grams " protein/day (1.2 - 1.5 grams of pro/kg)  Justification: Increased needs  Estimated Fluid Needs: 9082-6371 mL/day (25 - 30 mL/kg)   Justification: Maintenance    PHYSICAL FINDINGS  See malnutrition section below.  Scattered bruising       MALNUTRITION:  % Weight Loss:  Weight loss does not meet criteria for malnutrition as it is not significant  % Intake:  Decreased intake does not meet criteria for malnutrition as pt has met at least 75% estimated energy needs in the last couple days  Subcutaneous Fat Loss:  Upper arm region moderate depletion  Muscle Loss:  Temporal region moderate depletion, Clavicle bone region moderate depletion, Acromion bone region moderate depletion, and Dorsal hand region moderate depletion  Fluid Retention:  None noted    Malnutrition Diagnosis: Patient does not meet two of the above criteria necessary for diagnosing malnutrition yet is at risk with increased needs      NUTRITION DIAGNOSIS  Inadequate protein-energy intake related to increased needs secondary to sepsis as evidenced by meeting 76% estimated energy needs and 55% estimated protein needs      INTERVENTIONS  Implementation  Nutrition Education: Not appropriate at this time due to patient condition   Medical food supplement therapy     Goals  Patient to consume % of nutritionally adequate meals three times per day, or the equivalent with supplements/snacks.  Total avg nutritional intake to meet a minimum of 1755 kcal/kg and 100 g PRO/kg daily (per dosing wt 98 kg and 83.6 kg).     Monitoring/Evaluation  Progress toward goals will be monitored and evaluated per protocol. Po intake, supplement tolerance, wt, labs

## 2023-10-28 NOTE — PLAN OF CARE
Goal Outcome Evaluation:    Patient alert, oriented only to self and up with assist of 2 via lift. Diet tolerated but needs help with meals. PIV saline locked. Incontinent of urine. Bladder scan of 427ml. Straight cath of 300ml output after changing a wet pull up. TCU vs LTC at discharge per PT note depending on therapy participation and progress considering chronic cognitive deficit. Will continue to provide cares as needed.

## 2023-10-28 NOTE — PROGRESS NOTES
"[facility]  Hospitalist Progress Note     Assessment & Plan         Mr. Jamie Meyer is a 77M with hx of advanced dementia with behavioral disturbance and ambulatory dysfunction presented 10/26 with generalized weakness and found to have a fever to 38.8 on admission and WBC of 19.5.  UA concerning for infection and CXR with atelectasis versus pneumonia.    Patient improved from a infection standpoint but generalized weakness and ambulatory dysfunction will likely limit discharge given patient lives with wife and not much additional help at home. Also, unsure how much patient will participate with PT/OT given severe dementia.    Physical therapy is recommending TCU versus long-term care.  Likely will be stable for discharge by Monday.    PLAN    Sepsis  Acute Cystitis due to E. coli  Concern for Community Acquired Pneumonia versus atelectasis  -Presents with generalized weakness and found to meet SIRS criteria on adm with fever to 38.8 and WBC of 19.5  -UA concerning for infection, culture growing E. coli.  CXR w/ basilar infiltrates vs atelectasis  -Ceftriaxone monotherapy started on admission, will hold off on atypical coverage given pt improving with ceftriaxone alone and the suspicion that this may be primarily a UTI.    Generalized Weakness  Ambulatory Dysfunction  -Wife says that when he gets sick he \"forgets how to use his legs\"  -PT/OT consults    Advanced Dementia with Behavioral Disturbance  -Currently at baseline mental status  -Continue Seroquel 25mg qHS    DVT Prophy  -Heparin    Disposition  - PT recommending TCU versus long-term care.  Likely medically ready for discharge on Monday        Subjective     Seen at bedside along with wife.  Updated on plan of care.   Urine culture now growing E. coli sensitive to ceftriaxone with some resistance to other agents  Patient denies complaints  Discussed with his bedside RN  Some intermittent delirium        Objective   Blood pressure 106/60, " "pulse 68, temperature 98.2  F (36.8  C), temperature source Temporal, resp. rate 18, height 1.842 m (6' 0.52\"), weight 98 kg (216 lb 0.8 oz), SpO2 93%.    PHYSICAL EXAM  General: Somnolent but arousable to voice.  Chronically ill elderly man.  HEENT: NC/AT, eyes anicteric, external occular movements intact, face symmetric.  Cardiac: RRR, S1, S2.  No murmurs appreciated.  Pulmonary: Normal chest rise, normal work of breathing.  Lungs CTA BL  Abdomen: soft, non-tender, non-distended.  Bowel Sounds Present.  No guarding.  Extremities: no deformities.  Warm, well perfused.  Skin: no rashes or lesions noted.  Warm and Dry.  Neuro: No focal deficits noted.  Speech clear.  Coordination and strength grossly normal.  Psych: Appropriate affect.        LABS AND IMAGING  Reviewed and pertinent results discussed in assessment and plan.   "

## 2023-10-28 NOTE — PLAN OF CARE
"OT: Orders received. Chart reviewed and discussed with care team.  OT not indicated, per discussion with PT \"Spouse reports she assists pt with all ADLs. Pt was able to ambulate short distances with FWW at baseline\" and has following Ae at home; rolling walker, wheelchair, shower chair;grab bar by toilet and tub/shower;raised toilet seat.  Per PT note \"Pt currently below reported baseline for mobility. Is typically able to ambulate short distances with FWW, lives with spouse who assists with all cares. Pt currently requires maxA for sup>sit, session limited as he is very anxious with mobility. If is able to show increased participation and progress with IP PT, TCU could be considered. If pending pt progress, may need to consider LTC facility due to chronic cognitive deficits and need for assist\". Defer discharge recommendations to PT and care team.  Will complete orders.    "

## 2023-10-28 NOTE — CONSULTS
Care Management Initial Consult    General Information  Assessment completed with: Spouse or significant other, patient's wife (Jackie)  Type of CM/SW Visit: Initial Assessment    Primary Care Provider verified and updated as needed: Yes   Readmission within the last 30 days:     Reason for Consult: discharge planning     Communication Assessment  Patient's communication style: spoken language (English or Bilingual)    Hearing Difficulty or Deaf: no   Wear Glasses or Blind: no    Cognitive  Cognitive/Neuro/Behavioral: .WDL except, orientation  Level of Consciousness: alert, confused  Arousal Level: opens eyes spontaneously  Orientation: disoriented to, place, time, situation  Mood/Behavior: calm, cooperative  Best Language: 0 - No aphasia  Speech: clear    Living Environment:   People in home: spouse, child(laura), adult     Current living Arrangements: other (Lives in built on to pt's daughter home)       Family/Social Support:  Care provided by: spouse/significant other  Provides care for: no one  Marital Status:   Wife            Current Resources:   Patient receiving home care services:  None     Community Resources:    Equipment currently used at home: walker, rolling, wheelchair, manual, shower chair, grab bar, toilet, grab bar, tub/shower, raised toilet seat    Lifestyle & Psychosocial Needs:  Social Determinants of Health     Food Insecurity: Not on file   Depression: Not on file   Housing Stability: Not on file   Tobacco Use: Not on file   Financial Resource Strain: Not on file   Alcohol Use: Not on file   Transportation Needs: Not on file   Physical Activity: Not on file   Interpersonal Safety: Not on file   Stress: Not on file   Social Connections: Not on file     Additional Information:    SW met with pt's wife (Jackie) chairside as pt slept for consult. Pt's wife (Jackie) then asked if we could step outside of the room. Pt's wife (Jackie) stated they had moved to [place] 2 years ago from [place]  to assist their daughter (Nisha) who is going through some medical challenges. She stated they live in a built on to their daughter's home and have some stairs when going up or down.    Pt's wife (Jackie) reported she is her  primary care support and she assist him with bathing/showering etc. She stated that they are discussing what is needed for the long term but want to take it step by step and will start with the TCU recommendation. SW gave pt's wife a TCU pamphlet and discussed the options. SW notified pt's wife that SW would return around 2ish to check on referral options.     SW spoke with pt's wife after lunch and she stated the following 3 TCU options with priority preference. #1 [facility], [facility] and # 3 [facility]. SW submitted TCU referrals and will follow up on any updates once received. SW following.     Sheree Jimenez, MSW, LGSW   Care Management

## 2023-10-28 NOTE — PROGRESS NOTES
Admitting Diagnosis: AMS, UTI Date 10/26/23  PMH: Alzheimer  Orientation: A/Ox1 (self)    Vitals/Tele: Stable  Resp: LS Clear/ diminished RA    Pain: Denies  IV Access/drains: PIV x 1 SL  Diet:Regular needs help ordering and feeding  Labs/ Glucose: K+ 3.9 Redraw in AM. WBC 14.5 Hgb 11.7  Mobility:    GI/: Denies Nausea, vomiting, patient is c/o right lower abdominal pain, no pain with palpation. Gave PRN senna. LBM Unknown  Voiding Incontinent of urine. Wound/Skin: Scatted bruising    Consults:PT, OT SW   Summary/Discharge Plan:Continue with IV Abx. Patient lives with his wife and is the primary care taker. PRN seroquel for agitation given.  Continue to monitor        See Flow sheets for assessment

## 2023-10-28 NOTE — PLAN OF CARE
Goal Outcome Evaluation:    Pt needs assistance with eating. Pt meeting 76% estimated energy needs and 55% estimated protein needs. Ordered Gelatein Plus TID w/ meals and added assist with feeding as comment in diet order to promote PO intake.

## 2023-10-29 ENCOUNTER — APPOINTMENT (OUTPATIENT)
Dept: PHYSICAL THERAPY | Facility: CLINIC | Age: 78
DRG: 872 | End: 2023-10-29
Payer: MEDICARE

## 2023-10-29 ENCOUNTER — APPOINTMENT (OUTPATIENT)
Dept: CT IMAGING | Facility: CLINIC | Age: 78
DRG: 872 | End: 2023-10-29
Attending: INTERNAL MEDICINE
Payer: MEDICARE

## 2023-10-29 LAB — POTASSIUM SERPL-SCNC: 3.7 MMOL/L (ref 3.4–5.3)

## 2023-10-29 PROCEDURE — 250N000013 HC RX MED GY IP 250 OP 250 PS 637: Performed by: INTERNAL MEDICINE

## 2023-10-29 PROCEDURE — 250N000011 HC RX IP 250 OP 636: Performed by: STUDENT IN AN ORGANIZED HEALTH CARE EDUCATION/TRAINING PROGRAM

## 2023-10-29 PROCEDURE — 97530 THERAPEUTIC ACTIVITIES: CPT | Mod: GP

## 2023-10-29 PROCEDURE — 84132 ASSAY OF SERUM POTASSIUM: CPT | Performed by: INTERNAL MEDICINE

## 2023-10-29 PROCEDURE — 120N000001 HC R&B MED SURG/OB

## 2023-10-29 PROCEDURE — 97116 GAIT TRAINING THERAPY: CPT | Mod: GP

## 2023-10-29 PROCEDURE — 250N000011 HC RX IP 250 OP 636: Mod: JZ | Performed by: INTERNAL MEDICINE

## 2023-10-29 PROCEDURE — 99232 SBSQ HOSP IP/OBS MODERATE 35: CPT | Performed by: INTERNAL MEDICINE

## 2023-10-29 PROCEDURE — 36415 COLL VENOUS BLD VENIPUNCTURE: CPT | Performed by: INTERNAL MEDICINE

## 2023-10-29 PROCEDURE — 250N000013 HC RX MED GY IP 250 OP 250 PS 637: Performed by: STUDENT IN AN ORGANIZED HEALTH CARE EDUCATION/TRAINING PROGRAM

## 2023-10-29 PROCEDURE — G1010 CDSM STANSON: HCPCS

## 2023-10-29 RX ORDER — HALOPERIDOL 5 MG/ML
2 INJECTION INTRAMUSCULAR EVERY 6 HOURS PRN
Status: DISCONTINUED | OUTPATIENT
Start: 2023-10-29 | End: 2023-11-06 | Stop reason: HOSPADM

## 2023-10-29 RX ADMIN — HEPARIN SODIUM 5000 UNITS: 5000 INJECTION, SOLUTION INTRAVENOUS; SUBCUTANEOUS at 09:09

## 2023-10-29 RX ADMIN — SERTRALINE HYDROCHLORIDE 50 MG: 50 TABLET ORAL at 09:09

## 2023-10-29 RX ADMIN — ACETAMINOPHEN 650 MG: 325 TABLET, FILM COATED ORAL at 09:11

## 2023-10-29 RX ADMIN — QUETIAPINE FUMARATE 25 MG: 25 TABLET ORAL at 22:50

## 2023-10-29 RX ADMIN — CEFTRIAXONE 2 G: 2 INJECTION, POWDER, FOR SOLUTION INTRAMUSCULAR; INTRAVENOUS at 13:05

## 2023-10-29 RX ADMIN — CETIRIZINE HYDROCHLORIDE 10 MG: 10 TABLET, FILM COATED ORAL at 09:09

## 2023-10-29 RX ADMIN — HEPARIN SODIUM 5000 UNITS: 5000 INJECTION, SOLUTION INTRAVENOUS; SUBCUTANEOUS at 22:51

## 2023-10-29 ASSESSMENT — ACTIVITIES OF DAILY LIVING (ADL)
ADLS_ACUITY_SCORE: 47
ADLS_ACUITY_SCORE: 49
ADLS_ACUITY_SCORE: 47
ADLS_ACUITY_SCORE: 49
ADLS_ACUITY_SCORE: 49
ADLS_ACUITY_SCORE: 47
ADLS_ACUITY_SCORE: 49
ADLS_ACUITY_SCORE: 47
ADLS_ACUITY_SCORE: 47
ADLS_ACUITY_SCORE: 49
ADLS_ACUITY_SCORE: 49
ADLS_ACUITY_SCORE: 47

## 2023-10-29 NOTE — PLAN OF CARE
Goal Outcome Evaluation:    Patient alert, confused and oriented to self only. Up with assist of 2 via lift. Diet well tolerated with help during meals. On room air. PIV saline locked. Continue to receive rocephin q24h. Incontinent of bowel/urine. Bladder scan of 237 ml. VS q4h. K+ potocol continued with no replacement given. Denies any pain. Awaiting TCU placement. Referrals sent. SW following.

## 2023-10-29 NOTE — PROGRESS NOTES
"[facility]  Hospitalist Progress Note     Assessment & Plan         Mr. Jamie Meyer is a 77M with hx of advanced dementia with behavioral disturbance and ambulatory dysfunction presented 10/26 with generalized weakness and found to have a fever to 38.8 on admission and WBC of 19.5.  UA concerning for infection and CXR with atelectasis versus pneumonia.    Patient improved from a infection standpoint but generalized weakness and ambulatory dysfunction will likely limit discharge given patient lives with wife and not much additional help at home. Also, unsure how much patient will participate with PT/OT given severe dementia.    Physical therapy is recommending TCU versus long-term care.  Likely will be stable for discharge by Monday.  Much more awake, alert and appropriate on 10/29.  He was reporting some intermittent right-sided abdominal pain so we checked a CT scan without contrast which was negative for renal stones or other acute pathology.    PLAN    Sepsis  Acute Cystitis due to E. Coli.  CT scan negative for hydronephrosis or stones.  Concern for Community Acquired Pneumonia versus atelectasis  -Presented with generalized weakness and found to meet SIRS criteria on adm with fever to 38.8 and WBC of 19.5  -UA concerning for infection, culture growing E. coli.  CXR w/ basilar infiltrates vs atelectasis  -Ceftriaxone monotherapy started on admission, will hold off on atypical coverage given pt improving with ceftriaxone alone and the suspicion that this may be primarily a UTI.    Generalized Weakness  Ambulatory Dysfunction  -Wife says that when he gets sick he \"forgets how to use his legs\"  -PT/OT consults  -Anticipate TCU placement upon discharge.  I do think he can participate at this point with therapies.    Advanced Dementia with Behavioral Disturbance  -Currently at baseline mental status  -Continue Seroquel 25mg qHS    DVT Prophy  -Heparin    Disposition  - PT recommending TCU versus " "long-term care.  Likely medically ready for discharge on Monday        Subjective     CT scan today negative for stones  Much more awake and alert and overall appropriate  Likely ready for TCU placement tomorrow.  Referrals pending        Objective   Blood pressure 130/74, pulse 110, temperature 98.1  F (36.7  C), temperature source Temporal, resp. rate 18, height 1.842 m (6' 0.52\"), weight 98 kg (216 lb 0.8 oz), SpO2 95%.    PHYSICAL EXAM  General: Awake and alert, elderly man who appears fairly well and quite conversant today.  No evidence of delirium.  HEENT: NC/AT, eyes anicteric, external occular movements intact, face symmetric.  Cardiac: RRR, S1, S2.  No murmurs appreciated.  Pulmonary: Normal chest rise, normal work of breathing.  Lungs CTA BL  Abdomen: soft, non-tender, non-distended.  Bowel Sounds Present.  No guarding.  Extremities: no deformities.  Warm, well perfused.  Skin: no rashes or lesions noted.  Warm and Dry.  Neuro: No focal deficits noted.  Speech clear.  Coordination and strength grossly normal.  Psych: Appropriate affect.        LABS AND IMAGING  Reviewed and pertinent results discussed in assessment and plan.   "

## 2023-10-29 NOTE — PLAN OF CARE
Pt confused, alert to self only.  Pt tremulous this shift.  Complaining of RLQ abdominal pain, unable to replicate,not tender on palpation.  Pt rested some this shift, was awoken at 1830 to try to regulate sleep cycle.  Pt LS clear.  Plan is for TCU, per SW note, referrals have been sent.

## 2023-10-29 NOTE — PLAN OF CARE
Goal Outcome Evaluation:      Plan of Care Reviewed With: patient    Overall Patient Progress: improvingOverall Patient Progress: improving     Admitting Diagnosis: AMS, UTI Date 10/26/23  PMH: Alzheimer  Orientation: A/Ox1 (self)    Vitals/Tele: Stable  Resp: LS Clear/ diminished RA    Pain: Denies  IV Access/drains: PIV x 1 SL  Diet:Regular needs help ordering and feeding  Labs/ Glucose: K+ 3.7 Redraw in AM.   Mobility:  Assist of 2 with avel steady  GI/: Denies Nausea, vomiting, and abdominal pain this shift.  Gave PRN senna. LBM 2 soft BM this shift Voiding Incontinent of urine. Wound/Skin: Scatted bruising    Consults:PT, OT SW   Summary/Discharge Plan:Continue with IV Abx.  PRN seroquel for agitation given. Patient did walk around the room with PT today with assist of 2, walker and gait belt. Had Ct negative for any acute findings. Patient is having for clarity today with answer questions and more active trying to get out of bed. Wife has been helping a great deal keeping patient calm and with feeding.  Continue to monitor        See Flow sheets for assessment

## 2023-10-30 ENCOUNTER — APPOINTMENT (OUTPATIENT)
Dept: PHYSICAL THERAPY | Facility: CLINIC | Age: 78
DRG: 872 | End: 2023-10-30
Payer: MEDICARE

## 2023-10-30 LAB
ANION GAP SERPL CALCULATED.3IONS-SCNC: 9 MMOL/L (ref 7–15)
BUN SERPL-MCNC: 19.6 MG/DL (ref 8–23)
CALCIUM SERPL-MCNC: 8.5 MG/DL (ref 8.8–10.2)
CHLORIDE SERPL-SCNC: 109 MMOL/L (ref 98–107)
CREAT SERPL-MCNC: 0.72 MG/DL (ref 0.67–1.17)
DEPRECATED HCO3 PLAS-SCNC: 22 MMOL/L (ref 22–29)
EGFRCR SERPLBLD CKD-EPI 2021: >90 ML/MIN/1.73M2
ERYTHROCYTE [DISTWIDTH] IN BLOOD BY AUTOMATED COUNT: 12.9 % (ref 10–15)
GLUCOSE SERPL-MCNC: 124 MG/DL (ref 70–99)
HCT VFR BLD AUTO: 36.4 % (ref 40–53)
HGB BLD-MCNC: 12.2 G/DL (ref 13.3–17.7)
MCH RBC QN AUTO: 31.9 PG (ref 26.5–33)
MCHC RBC AUTO-ENTMCNC: 33.5 G/DL (ref 31.5–36.5)
MCV RBC AUTO: 95 FL (ref 78–100)
PLATELET # BLD AUTO: 188 10E3/UL (ref 150–450)
POTASSIUM SERPL-SCNC: 3.7 MMOL/L (ref 3.4–5.3)
RBC # BLD AUTO: 3.83 10E6/UL (ref 4.4–5.9)
SODIUM SERPL-SCNC: 140 MMOL/L (ref 135–145)
WBC # BLD AUTO: 7.2 10E3/UL (ref 4–11)

## 2023-10-30 PROCEDURE — 36415 COLL VENOUS BLD VENIPUNCTURE: CPT | Performed by: INTERNAL MEDICINE

## 2023-10-30 PROCEDURE — 250N000013 HC RX MED GY IP 250 OP 250 PS 637: Performed by: STUDENT IN AN ORGANIZED HEALTH CARE EDUCATION/TRAINING PROGRAM

## 2023-10-30 PROCEDURE — 97530 THERAPEUTIC ACTIVITIES: CPT | Mod: GP | Performed by: PHYSICAL THERAPIST

## 2023-10-30 PROCEDURE — 120N000001 HC R&B MED SURG/OB

## 2023-10-30 PROCEDURE — 80048 BASIC METABOLIC PNL TOTAL CA: CPT | Performed by: INTERNAL MEDICINE

## 2023-10-30 PROCEDURE — 97116 GAIT TRAINING THERAPY: CPT | Mod: GP | Performed by: PHYSICAL THERAPIST

## 2023-10-30 PROCEDURE — 250N000011 HC RX IP 250 OP 636: Performed by: STUDENT IN AN ORGANIZED HEALTH CARE EDUCATION/TRAINING PROGRAM

## 2023-10-30 PROCEDURE — 250N000011 HC RX IP 250 OP 636: Mod: JZ | Performed by: INTERNAL MEDICINE

## 2023-10-30 PROCEDURE — 99232 SBSQ HOSP IP/OBS MODERATE 35: CPT | Performed by: INTERNAL MEDICINE

## 2023-10-30 PROCEDURE — 85027 COMPLETE CBC AUTOMATED: CPT | Performed by: INTERNAL MEDICINE

## 2023-10-30 PROCEDURE — 250N000013 HC RX MED GY IP 250 OP 250 PS 637: Performed by: INTERNAL MEDICINE

## 2023-10-30 RX ADMIN — CETIRIZINE HYDROCHLORIDE 10 MG: 10 TABLET, FILM COATED ORAL at 09:11

## 2023-10-30 RX ADMIN — CEFTRIAXONE 2 G: 2 INJECTION, POWDER, FOR SOLUTION INTRAMUSCULAR; INTRAVENOUS at 13:21

## 2023-10-30 RX ADMIN — HEPARIN SODIUM 5000 UNITS: 5000 INJECTION, SOLUTION INTRAVENOUS; SUBCUTANEOUS at 09:11

## 2023-10-30 RX ADMIN — QUETIAPINE FUMARATE 25 MG: 25 TABLET ORAL at 17:38

## 2023-10-30 RX ADMIN — QUETIAPINE FUMARATE 25 MG: 25 TABLET ORAL at 21:46

## 2023-10-30 RX ADMIN — SERTRALINE HYDROCHLORIDE 50 MG: 50 TABLET ORAL at 09:11

## 2023-10-30 RX ADMIN — Medication 1 MG: at 01:58

## 2023-10-30 RX ADMIN — HEPARIN SODIUM 5000 UNITS: 5000 INJECTION, SOLUTION INTRAVENOUS; SUBCUTANEOUS at 21:46

## 2023-10-30 ASSESSMENT — ACTIVITIES OF DAILY LIVING (ADL)
ADLS_ACUITY_SCORE: 49
ADLS_ACUITY_SCORE: 53
ADLS_ACUITY_SCORE: 49
ADLS_ACUITY_SCORE: 53
ADLS_ACUITY_SCORE: 49
ADLS_ACUITY_SCORE: 49

## 2023-10-30 NOTE — PLAN OF CARE
Goal Outcome Evaluation:    Patient alert, oriented only to self and up with assist of 2. Diet well tolerated with help during meals. On room air. PIV saline locked. Rocephin continued. Incontinent of urine. K+ protocol continued with redraw this morning. No replacement given. Denies pain. Mostly up overnight. Melatonin given for sleep. Awaiting TCU placement. SW following.

## 2023-10-30 NOTE — PROGRESS NOTES
[facility]  Hospitalist Progress Note     Assessment & Plan         Mr. Jamie Meyer is a 77M with hx of advanced dementia with behavioral disturbance and ambulatory dysfunction presented 10/26 with generalized weakness and found to have a fever to 38.8 on admission and WBC of 19.5.  UA concerning for infection and CXR with atelectasis versus pneumonia.    Patient improved from a infection standpoint but generalized weakness and ambulatory dysfunction will likely limit discharge given patient lives with wife and not much additional help at home. Also, unsure how much patient will participate with PT/OT given severe dementia.    Physical therapy is recommending TCU versus long-term care.  Likely will be stable for discharge by Monday.  Much more awake, alert and appropriate on 10/29.  He was reporting some intermittent right-sided abdominal pain so we checked a CT scan without contrast which was negative for renal stones or other acute pathology.    At this point he has been afebrile and denies complaints.  Remains on ceftriaxone (started 10/26) for complicated urinary tract infection.  Could transition to oral agents in the next day or 2 if so desired.    Working on TCU placement but apparently this might prove somewhat difficult.  He does seem to be completely calm and appropriate but does have some baseline dementia.  Continuing rehab efforts while here.    PLAN    Sepsis  Acute complicated cystitis due to E. Coli.  CT scan negative for hydronephrosis or stones.  Lesser concern for Community Acquired Pneumonia versus atelectasis  -Presented with generalized weakness and found to meet SIRS criteria on adm with fever to 38.8 and WBC of 19.5  -UA concerning for infection, culture growing E. coli.  CXR w/ basilar infiltrates vs atelectasis  -Ceftriaxone monotherapy started on admission, will hold off on atypical coverage given pt improving with ceftriaxone alone and the suspicion that this may be  "primarily a UTI.  -Treating as a complicated UTI given male gender.  Consider 7 to 10 days of total treatment    Generalized Weakness: Improving  Ambulatory Dysfunction  -Wife says that when he gets sick he \"forgets how to use his legs\"  -PT/OT consults  -Anticipate TCU placement upon discharge.  I do think he can participate at this point with therapies.     Moderate to advanced Dementia with previous history of behavioral Disturbances, though recently has been calm and appropriate  -Currently at baseline mental status  -Continue Seroquel 25mg at bedtime as needed    DVT Prophy  -Heparin    Lactic acidosis: Upon admission.  Suggestive of of at least mild septic shock given high fever and white blood count of 19.5.  Resolved with fluids.    Disposition  -Medically ready to discharge to TCU pending placement.        Subjective     Fairly dramatic improvement from a mental status standpoint in the last couple of days.  Has some baseline dementia but not seeing any delirium.    Up in chair today, calm and pleasant and denies complaints    Working on TCU placement        Objective   Blood pressure (!) 156/114, pulse 102, temperature 97.3  F (36.3  C), temperature source Temporal, resp. rate 17, height 1.842 m (6' 0.52\"), weight 98 kg (216 lb 0.8 oz), SpO2 94%.    PHYSICAL EXAM  General: Awake and alert, elderly man who appears fairly well and quite conversant today.  No evidence of delirium.  HEENT: NC/AT, eyes anicteric, external occular movements intact, face symmetric.  Cardiac: RRR, S1, S2.  No murmurs appreciated.  Pulmonary: Normal chest rise, normal work of breathing.  Lungs CTA BL  Abdomen: soft, non-tender, non-distended.  Bowel Sounds Present.  No guarding.  Extremities: no deformities.  Warm, well perfused.  Skin: no rashes or lesions noted.  Warm and Dry.  Neuro: No focal deficits noted.  Speech clear.  Coordination and strength grossly normal.  Psych: Appropriate affect.        LABS AND IMAGING  Reviewed " and pertinent results discussed in assessment and plan.

## 2023-10-30 NOTE — PLAN OF CARE
Goal Outcome Evaluation:     Patient alert to self only and confused. VSS, on RA. Up Ax1-2 with avel steady. LS clear. BS active, BM this shift. Incontinent of bowel and bladder. Up in the recliner, calm when wife present. Denies pain and nausea. No SOB noted. Infrequent dry cough. Awaiting for TCU placement.

## 2023-10-30 NOTE — PROGRESS NOTES
Patient alert to self only and confused. VSS, on RA. Up Ax1-2 with avel steady. LS clear. BS active, BM this shift. Incontinent of bowel and bladder. Up in the recliner, calm when wife present. Denies pain and nausea. No SOB noted. Infrequent dry cough. Awaiting for TCU placement.

## 2023-10-30 NOTE — PROGRESS NOTES
Care Management Follow Up    Length of Stay (days): 4    Expected Discharge Date: 10/31/2023     Concerns to be Addressed:    discharge planning   Patient plan of care discussed at interdisciplinary rounds: Yes    Anticipated Discharge Disposition:  TCU     Anticipated Discharge Services:    Anticipated Discharge DME:      Patient/family educated on Medicare website which has current facility and service quality ratings:  yes  Education Provided on the Discharge Plan:    Patient/Family in Agreement with the Plan:  yes    Referrals Placed by CM/SW:  TCUs    Additional Information:  Sw met with pt and spouse to obtain additional TCU requests. Spouse requested [facility], sent.  They will look for additional ones.     SW asked if spouse needs resources for future placement for pt; spouse reports already having connection with places.    DISHA Flores, [facility]  Inpatient Care Coordination  [facility]  [phone]

## 2023-10-31 ENCOUNTER — APPOINTMENT (OUTPATIENT)
Dept: PHYSICAL THERAPY | Facility: CLINIC | Age: 78
DRG: 872 | End: 2023-10-31
Payer: MEDICARE

## 2023-10-31 LAB — POTASSIUM SERPL-SCNC: 3.7 MMOL/L (ref 3.4–5.3)

## 2023-10-31 PROCEDURE — 36415 COLL VENOUS BLD VENIPUNCTURE: CPT | Performed by: INTERNAL MEDICINE

## 2023-10-31 PROCEDURE — 250N000013 HC RX MED GY IP 250 OP 250 PS 637: Performed by: INTERNAL MEDICINE

## 2023-10-31 PROCEDURE — 84132 ASSAY OF SERUM POTASSIUM: CPT | Performed by: INTERNAL MEDICINE

## 2023-10-31 PROCEDURE — 99231 SBSQ HOSP IP/OBS SF/LOW 25: CPT | Performed by: INTERNAL MEDICINE

## 2023-10-31 PROCEDURE — 250N000011 HC RX IP 250 OP 636: Performed by: STUDENT IN AN ORGANIZED HEALTH CARE EDUCATION/TRAINING PROGRAM

## 2023-10-31 PROCEDURE — 120N000001 HC R&B MED SURG/OB

## 2023-10-31 PROCEDURE — 250N000011 HC RX IP 250 OP 636: Mod: JZ | Performed by: INTERNAL MEDICINE

## 2023-10-31 PROCEDURE — 97116 GAIT TRAINING THERAPY: CPT | Mod: GP | Performed by: PHYSICAL THERAPIST

## 2023-10-31 PROCEDURE — 97530 THERAPEUTIC ACTIVITIES: CPT | Mod: GP | Performed by: PHYSICAL THERAPIST

## 2023-10-31 RX ADMIN — CETIRIZINE HYDROCHLORIDE 10 MG: 10 TABLET, FILM COATED ORAL at 09:07

## 2023-10-31 RX ADMIN — HEPARIN SODIUM 5000 UNITS: 5000 INJECTION, SOLUTION INTRAVENOUS; SUBCUTANEOUS at 21:23

## 2023-10-31 RX ADMIN — HEPARIN SODIUM 5000 UNITS: 5000 INJECTION, SOLUTION INTRAVENOUS; SUBCUTANEOUS at 09:07

## 2023-10-31 RX ADMIN — QUETIAPINE FUMARATE 25 MG: 25 TABLET ORAL at 18:15

## 2023-10-31 RX ADMIN — CEFTRIAXONE 2 G: 2 INJECTION, POWDER, FOR SOLUTION INTRAMUSCULAR; INTRAVENOUS at 13:39

## 2023-10-31 RX ADMIN — SERTRALINE HYDROCHLORIDE 50 MG: 50 TABLET ORAL at 09:07

## 2023-10-31 RX ADMIN — QUETIAPINE FUMARATE 25 MG: 25 TABLET ORAL at 21:20

## 2023-10-31 ASSESSMENT — ACTIVITIES OF DAILY LIVING (ADL)
ADLS_ACUITY_SCORE: 53
DEPENDENT_IADLS:: CLEANING;COOKING;LAUNDRY;SHOPPING;MEAL PREPARATION;MEDICATION MANAGEMENT;MONEY MANAGEMENT;TRANSPORTATION;INCONTINENCE
ADLS_ACUITY_SCORE: 53

## 2023-10-31 NOTE — PLAN OF CARE
Goal Outcome Evaluation:    Assumed cares [id number]. Pt restless, alert, and confused. Denies pain or SOB. Afebrile, BP oft 100/65. LS dim on RA. Incontinent of urine x3. Restless, changing positions frequently in bed. Did not get up this shift. Slept on and off.

## 2023-10-31 NOTE — PROGRESS NOTES
Patient alert to self only and confused. VSS, on RA. Up Ax2 with walker and gait belt. Denies pain and nausea. No SOB noted. BM this shift. Awaiting for TCU placement. On IV abx.

## 2023-10-31 NOTE — PROGRESS NOTES
[facility]  Hospitalist Progress Note     Assessment & Plan         Mr. Jamie Meyer is a 77M with hx of advanced dementia with behavioral disturbance and ambulatory dysfunction presented 10/26 with generalized weakness and found to have a fever to 38.8 on admission and WBC of 19.5.  UA concerning for infection and CXR with atelectasis versus pneumonia.    Patient improved from a infection standpoint but generalized weakness and ambulatory dysfunction will likely limit discharge given patient lives with wife and not much additional help at home. Also, unsure how much patient will participate with PT/OT given severe dementia.    Physical therapy is recommending TCU versus long-term care.  Likely will be stable for discharge by Monday.  Much more awake, alert and appropriate on 10/29.  He was reporting some intermittent right-sided abdominal pain so we checked a CT scan without contrast which was negative for renal stones or other acute pathology.    At this point he has been afebrile and denies complaints.  Remains on ceftriaxone (started 10/26) for complicated urinary tract infection.  Could transition to oral agents in the next day or 2 if so desired.    Working on TCU placement but apparently this might prove somewhat difficult.  He does seem to be completely calm and appropriate but does have some baseline dementia.  Continuing rehab efforts while here.    PLAN    Sepsis  Acute complicated cystitis due to E. Coli.  CT scan negative for hydronephrosis or stones.  Lesser concern for Community Acquired Pneumonia versus atelectasis  -Presented with generalized weakness and found to meet SIRS criteria on adm with fever to 38.8 and WBC of 19.5  -UA concerning for infection, culture growing E. Coli R to amp, cipro, tmp-smx.  CXR w/ basilar infiltrates vs atelectasis  -Ceftriaxone monotherapy started on admission, will hold off on atypical coverage given pt improving with ceftriaxone alone and the  "suspicion that this may be primarily a UTI.  -Treating as a complicated UTI given male gender.  On day 6/10 days    Generalized Weakness: Improving  Ambulatory Dysfunction  -Wife says that when he gets sick he \"forgets how to use his legs\"  -PT/OT consults recommending TCU vs LTC at discharge.  Will be a difficult placement due to dementia   -his dementia is much improved, will request PT/OT re-evaluation      Moderate to advanced Dementia with previous history of behavioral Disturbances, though recently has been calm and appropriate  -Currently at baseline mental status  -Continue Seroquel 25mg at bedtime as needed    DVT Prophy  -Heparin    Lactic acidosis: Upon admission.  Suggestive of of at least mild septic shock given high fever and white blood count of 19.5.  Resolved with fluids.    Disposition  -Medically ready to discharge to TCU pending placement.        Subjective     Quite pleasant, eating breakfast.  Denies any cp or sob, no n/v, no pain         Objective   Blood pressure (!) 136/98, pulse 80, temperature 97.5  F (36.4  C), temperature source Temporal, resp. rate 18, height 1.842 m (6' 0.52\"), weight 98 kg (216 lb 0.8 oz), SpO2 96%.    PHYSICAL EXAM  General: Awake and alert, elderly man who appears fairly well and quite conversant today.  No evidence of delirium.  HEENT: NC/AT, eyes anicteric, external occular movements intact, face symmetric.  Cardiac: RRR, S1, S2.  No murmurs appreciated.  Pulmonary: Normal chest rise, normal work of breathing.  Lungs CTA BL  Abdomen: soft, non-tender, non-distended.  Bowel Sounds Present.  No guarding.  Extremities: no deformities.  Warm, well perfused.  Skin: no rashes or lesions noted.  Warm and Dry.  Neuro: No focal deficits noted.  Speech clear.  Coordination and strength grossly normal.  Psych: Appropriate affect.        LABS AND IMAGING  Reviewed and pertinent results discussed in assessment and plan.   "

## 2023-10-31 NOTE — PLAN OF CARE
Goal Outcome Evaluation:    3pm-11pm RN    Patient VS are at baseline: Yes  Patient able to ambulate as they were prior to admission or with assist devices provided by therapy during their stay:  Patient must void prior to discharge: No transfers with assist of two using the avel steady  Patient able to tolerate oral intake: Yes  Patient has adequate pain control using oral analgesics: Yes    Patient restless sometimes, PRN seroquel given once during shift. Is incontinent of B&B. 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 Mepilex on coccyx area barrier cream applied to buttocks. Plan is to discharge to TCU.

## 2023-10-31 NOTE — PROGRESS NOTES
Care Management Follow Up    Length of Stay (days): 5    Expected Discharge Date: 11/01/2023     Concerns to be Addressed: discharge planning     Patient plan of care discussed at interdisciplinary rounds: Yes    Anticipated Discharge Disposition:       Anticipated Discharge Services:    Anticipated Discharge DME:      Patient/family educated on Medicare website which has current facility and service quality ratings:  yes  Education Provided on the Discharge Plan:    Patient/Family in Agreement with the Plan: yes    Referrals Placed by CM/SW:  TCUs    Additional Information:  SW received additional TCU requests from spouse, sent.  [facility] declined pt stating pt's diagnosed with Alzheimer's with behavior disturbance and needs LTC. SW followed up with spouse and informed of this decline and other declines.  Spouse has no idea where the behavioral disturbance came from since pt has never had behaviors, SW is unaware but will look in his chart to see if SW can see it and advocate for pt to get into [facility].  SW did provide a list of other cog unit TCUs in the [facility] area.      Spouse asked if pt could go directly to LTC/, pt could go if they are able to assist with pt's level of care.  Spouse is planning on finding a facility but thought she would have time to get that in place while pt is in TCU. Spouse doesn't want to travel to [facility] or other areas to visit pt since she doesn't know the area.  Spouse discussed that [facility] shared pt could rehab there. Sw asked spouse to look into the facilities and see where she wants pt to go since they may be able to take pt at his level of assist and/or pt will have a place to move into once rehab is done.      Spouse had daughter call [facility] to discuss above.  The are looking into a couple other memory cares this week. SW will advocate with [facility] admit pt.    MADHU called [facility] and shared that Sw did review neurology notes and don't see where the  "behaviors were diagnosed at.  Shared spouse stated there hasn't been disturbance.  The other concern is pt has \"alzheimer's\", they will review again.  Discussed how pt needs to get stronger so TCU would be best before memory care. If they admit, pt will need scheduled seroquel.    DISHA Flores, [facility]  Inpatient Care Coordination  [facility]  [phone]        "

## 2023-11-01 ENCOUNTER — APPOINTMENT (OUTPATIENT)
Dept: PHYSICAL THERAPY | Facility: CLINIC | Age: 78
DRG: 872 | End: 2023-11-01
Payer: MEDICARE

## 2023-11-01 LAB
BACTERIA BLD CULT: NO GROWTH
BACTERIA BLD CULT: NO GROWTH
HOLD SPECIMEN: NORMAL
POTASSIUM SERPL-SCNC: 3.9 MMOL/L (ref 3.4–5.3)

## 2023-11-01 PROCEDURE — 99231 SBSQ HOSP IP/OBS SF/LOW 25: CPT | Performed by: INTERNAL MEDICINE

## 2023-11-01 PROCEDURE — 120N000001 HC R&B MED SURG/OB

## 2023-11-01 PROCEDURE — 97530 THERAPEUTIC ACTIVITIES: CPT | Mod: GP | Performed by: PHYSICAL THERAPIST

## 2023-11-01 PROCEDURE — 36415 COLL VENOUS BLD VENIPUNCTURE: CPT | Performed by: STUDENT IN AN ORGANIZED HEALTH CARE EDUCATION/TRAINING PROGRAM

## 2023-11-01 PROCEDURE — 250N000013 HC RX MED GY IP 250 OP 250 PS 637: Performed by: INTERNAL MEDICINE

## 2023-11-01 PROCEDURE — 84132 ASSAY OF SERUM POTASSIUM: CPT | Performed by: STUDENT IN AN ORGANIZED HEALTH CARE EDUCATION/TRAINING PROGRAM

## 2023-11-01 PROCEDURE — 250N000011 HC RX IP 250 OP 636: Performed by: STUDENT IN AN ORGANIZED HEALTH CARE EDUCATION/TRAINING PROGRAM

## 2023-11-01 PROCEDURE — 97116 GAIT TRAINING THERAPY: CPT | Mod: GP | Performed by: PHYSICAL THERAPIST

## 2023-11-01 PROCEDURE — 250N000011 HC RX IP 250 OP 636: Mod: JZ | Performed by: INTERNAL MEDICINE

## 2023-11-01 RX ORDER — QUETIAPINE FUMARATE 25 MG/1
25 TABLET, FILM COATED ORAL 2 TIMES DAILY
Status: DISCONTINUED | OUTPATIENT
Start: 2023-11-01 | End: 2023-11-06 | Stop reason: HOSPADM

## 2023-11-01 RX ADMIN — QUETIAPINE FUMARATE 25 MG: 25 TABLET ORAL at 16:09

## 2023-11-01 RX ADMIN — CEFTRIAXONE 2 G: 2 INJECTION, POWDER, FOR SOLUTION INTRAMUSCULAR; INTRAVENOUS at 14:53

## 2023-11-01 RX ADMIN — QUETIAPINE FUMARATE 25 MG: 25 TABLET ORAL at 21:28

## 2023-11-01 RX ADMIN — SERTRALINE HYDROCHLORIDE 50 MG: 50 TABLET ORAL at 09:17

## 2023-11-01 RX ADMIN — CETIRIZINE HYDROCHLORIDE 10 MG: 10 TABLET, FILM COATED ORAL at 09:17

## 2023-11-01 RX ADMIN — HEPARIN SODIUM 5000 UNITS: 5000 INJECTION, SOLUTION INTRAVENOUS; SUBCUTANEOUS at 21:28

## 2023-11-01 RX ADMIN — HEPARIN SODIUM 5000 UNITS: 5000 INJECTION, SOLUTION INTRAVENOUS; SUBCUTANEOUS at 09:17

## 2023-11-01 ASSESSMENT — ACTIVITIES OF DAILY LIVING (ADL)
ADLS_ACUITY_SCORE: 53

## 2023-11-01 NOTE — PROGRESS NOTES
[facility]  Hospitalist Progress Note     Assessment & Plan         Mr. Jamie Meyer is a 77M with hx of advanced dementia with behavioral disturbance and ambulatory dysfunction presented 10/26 with generalized weakness and found to have a fever to 38.8 on admission and WBC of 19.5.  UA concerning for infection and CXR with atelectasis versus pneumonia.    Patient improved from a infection standpoint but generalized weakness and ambulatory dysfunction will likely limit discharge given patient lives with wife and not much additional help at home. Also, unsure how much patient will participate with PT/OT given severe dementia.    Physical therapy is recommending TCU versus long-term care.  Likely will be stable for discharge by Monday.  Much more awake, alert and appropriate on 10/29.  He was reporting some intermittent right-sided abdominal pain so we checked a CT scan without contrast which was negative for renal stones or other acute pathology.    At this point he has been afebrile and denies complaints.  Remains on ceftriaxone (started 10/26) for complicated urinary tract infection.  Could transition to oral agents in the next day or 2 if so desired.    Working on TCU placement but apparently this might prove somewhat difficult.  He does seem to be completely calm and appropriate but does have some baseline dementia.  Continuing rehab efforts while here.    PLAN    Sepsis  Acute complicated cystitis due to E. Coli.  CT scan negative for hydronephrosis or stones.  Lesser concern for Community Acquired Pneumonia versus atelectasis  -Presented with generalized weakness and found to meet SIRS criteria on adm with fever to 38.8 and WBC of 19.5  -UA concerning for infection, culture growing E. Coli R to amp, cipro, tmp-smx.  CXR w/ basilar infiltrates vs atelectasis  -Ceftriaxone monotherapy started on admission, will hold off on atypical coverage given pt improving with ceftriaxone alone and the  "suspicion that this may be primarily a UTI.  -Treating as a complicated UTI given male gender.  On day 7/10 days, will switch to oral starting tomorrow    Generalized Weakness: Improving  Ambulatory Dysfunction  -Wife says that when he gets sick he \"forgets how to use his legs\"  -PT/OT consults recommending TCU vs LTC at discharge.  Will be a difficult placement due to dementia   -his dementia is much improved, will request PT/OT re-evaluation      Moderate to advanced Dementia with previous history of behavioral Disturbances, though recently has been calm and appropriate  -Currently at baseline mental status, he actually remembered me from yesterday  -he is chronically on quetiapine 25 mg at bedtime      *he is also getting additional doses prn, he mostly seems to be getting a dose 4-6 pm, so I have scheduled that in anticipation of discharge tomorrow       DVT Prophy  -Heparin    Lactic acidosis: Upon admission.  Suggestive of of at least mild septic shock given high fever and white blood count of 19.5.  Resolved with fluids.    Disposition  -Medically ready to discharge to TCU pending placement.        Subjective   Pleasant sitting up in chair watching TV. Recognizes me from yesterday.  Denies any cp or sob        Objective   Blood pressure 124/63, pulse 67, temperature 97.6  F (36.4  C), temperature source Temporal, resp. rate 18, height 1.842 m (6' 0.52\"), weight 98 kg (216 lb 0.8 oz), SpO2 94%.    PHYSICAL EXAM  General: Awake and alert, elderly man who appears fairly well and quite conversant today.  No evidence of delirium.  HEENT: NC/AT, eyes anicteric, external occular movements intact, face symmetric.  Cardiac: RRR, S1, S2.  No murmurs appreciated.  Pulmonary: Normal chest rise, normal work of breathing.  Lungs CTA BL  Abdomen: soft, non-tender, non-distended.  Bowel Sounds Present.  No guarding.  Extremities: no deformities.  Warm, well perfused.  Skin: no rashes or lesions noted.  Warm and Dry.  Neuro: " No focal deficits noted.  Speech clear.    Psych: Appropriate affect.        LABS AND IMAGING  Reviewed and pertinent results discussed in assessment and plan.

## 2023-11-01 NOTE — PLAN OF CARE
Goal Outcome Evaluation:       Pt alert to self only. VSS. PIV SL. Denies pain over night. Assist x2 with walker and gait belt. Tolerating a regular diet, feeder. Voiding well, incontinent of B&B. Cms intact. Plan is TCU at discharge.

## 2023-11-01 NOTE — PLAN OF CARE
Goal Outcome Evaluation:      Plan of Care Reviewed With: spouse    Overall Patient Progress: improvingOverall Patient Progress: improving    3pm-11pm RN    Patient VS are at baseline: Yes  Patient able to ambulate as they were prior to admission or with assist devices provided by therapy during their stay: No needs assist of two using walker and gait belt  Patient must void prior to discharge: Yes is incontinent  Patient able to tolerate oral intake: Yes  Patient has adequate pain control using oral analgesics: Denied pain    Patient only alert to self, transfers with assist of two walker and gait belt. Restless on and off, seroquel PRN once and scheduled one at bedtime. Was incontinent of B&B, needs help eating, wife here all afternoon.Mepilex on coccyx, barrier cream applied to redness on buttocks. Plan is to discharge to TCU.

## 2023-11-01 NOTE — PROGRESS NOTES
Care Management Follow Up    Length of Stay (days): 6    Expected Discharge Date: 11/06/2023     Concerns to be Addressed: all concerns addressed in this encounter     Patient plan of care discussed at interdisciplinary rounds: Yes    Anticipated Discharge Disposition: Assisted Living, Home Care     Anticipated Discharge Services:    Anticipated Discharge DME:      Patient/family educated on Medicare website which has current facility and service quality ratings: yes  Education Provided on the Discharge Plan: Yes  Patient/Family in Agreement with the Plan: yes    Referrals Placed by CM/SW:    Private pay costs discussed: transportation costs    Additional Information:  Pt's spouse met with [facility] and stated pt is moving into [facility] care on Monday. Pt will need home care PT/OT orders.  Spouse wants to discontinue the search for a TCU since pt can recover at the memory care.    [facility] received a call from Ry Vega [phone], [facility], requesting clinicals and notes.  SNF packet and recent nursing/nutrition notes sent via communications tab, F) [phone].    Pt needs to have assessment paperwork done by [facility], they are planning on coming to [facility] Thursday/Friday.  Ry will email [facility] with date/time.     Spouse requested [facility] transport and is knowledgeable of costs.      DISHA Flores, [facility]  Inpatient Care Coordination  [facility]  [phone]    NORI HAMPTON

## 2023-11-01 NOTE — PROGRESS NOTES
"CLINICAL NUTRITION SERVICES - REASSESSMENT NOTE    Recommendations Ordered by Registered Dietitian (RD): none at this time   Malnutrition: Patient does not meet two of the above criteria necessary for diagnosing malnutrition     EVALUATION OF PROGRESS TOWARD GOALS   Diet:  Regular and Cherry Gelatein with meals    Intake/Tolerance:  Jamie reported to have a \"great\" appetite and felt he's been eating pretty well over the past few days. He's ordering quite a bit from room service and when asked if he's finishing everything he orders, he replied \"pretty much.\" Jamie reported his BM are \"going fine' and when asked about the one day with x3 BM, he just thought that had to do with his increasing intake. Jamie is enjoying the Gelatein TID and didn't want to make any changes. I told Jamie he's doing a good job from a nutrition perspective and encouraged him to keep it up!    Per flow sheet, pt has a good appetite most days, consuming 100% of x2 meals per day.   Per [employer] (meal ordering system), pt ordering a 4-day average of 2789 kcal (133% est needs) and 141 g PRO (113% est needs) .    ASSESSED NUTRITION NEEDS:  Dosing Weight: 98 kg and 83.6 kg ABW     ASSESSED NUTRITION NEEDS  Estimated Energy Needs: 4078-9107 kcals/day ([place] [employer])  Justification: Maintenance and Overweight  Estimated Protein Needs: 100-125 grams protein/day (1.2 - 1.5 grams of pro/kg)  Justification: Increased needs  Estimated Fluid Needs: 6510-4277 mL/day (25 - 30 mL/kg)   Justification: Maintenance    NEW FINDINGS:   Weight:   10/27/23 1300 98 kg (216 lb 0.8 oz) Bed scale   10/26/23 2140 96 kg (211 lb 10.3 oz) Bed scale     Labs:  (H)    Meds: Reviewed    GI: x1-3 BM most days; 10/28 no BM    Previous Goals:   Patient to consume % of nutritionally adequate meals three times per day, or the equivalent with supplements/snacks.  Evaluation: Met  Total avg nutritional intake to meet a minimum of 1755 kcal/kg and 100 g PRO/kg " daily (per dosing wt 98 kg and 83.6 kg).  Evaluation: Met    Previous Nutrition Diagnosis:   Inadequate protein-energy intake related to increased needs secondary to sepsis as evidenced by meeting 76% estimated energy needs and 55% estimated protein needs    Evaluation: Improving    Previous Malnutrition:  % Weight Loss:  Weight loss does not meet criteria for malnutrition as it is not significant  % Intake:  Decreased intake does not meet criteria for malnutrition as pt has met at least 75% estimated energy needs in the last couple days  Subcutaneous Fat Loss:  Upper arm region moderate depletion  Muscle Loss:  Temporal region moderate depletion, Clavicle bone region moderate depletion, Acromion bone region moderate depletion, and Dorsal hand region moderate depletion  Fluid Retention:  None noted     Malnutrition Diagnosis: Patient does not meet two of the above criteria necessary for diagnosing malnutrition yet is at risk with increased needs    MALNUTRITION  % Weight Loss:  None noted  % Intake:  No decreased intake noted  Subcutaneous Fat Loss:  Upper arm region moderate depletion  Muscle Loss:  Temporal region moderate depletion, Clavicle bone region moderate  depletion, Acromion bone region moderate  depletion, and Dorsal hand region moderate  depletion  Fluid Retention:  None noted    Malnutrition Diagnosis: Patient does not meet two of the above criteria necessary for diagnosing malnutrition    CURRENT NUTRITION DIAGNOSIS  Predicted inadequate nutrient intake related to impaired cognition 2/2 advanced dementia as evidenced by need for oral nutrition supplements to ensure nutrition needs are met.     INTERVENTIONS  Recommendations / Nutrition Prescription  None at this time     Implementation  General/healthful diet    Goals  Patient to consume % of nutritionally adequate meal trays TID, or the equivalent with supplements/snacks.    MONITORING AND EVALUATION:  Progress towards goals will be monitored  and evaluated per protocol and Practice Guidelines    Rashel Ardon RD

## 2023-11-01 NOTE — PLAN OF CARE
Goal Outcome Evaluation:      Plan of Care Reviewed With: patient    Overall Patient Progress: improvingOverall Patient Progress: improving    Outcome Evaluation: Patient to consume % of nutritionally adequate meal trays TID, or the equivalent with supplements/snacks.

## 2023-11-02 LAB
CREAT SERPL-MCNC: 0.73 MG/DL (ref 0.67–1.17)
EGFRCR SERPLBLD CKD-EPI 2021: >90 ML/MIN/1.73M2
PLATELET # BLD AUTO: 262 10E3/UL (ref 150–450)
POTASSIUM SERPL-SCNC: 4.1 MMOL/L (ref 3.4–5.3)

## 2023-11-02 PROCEDURE — 250N000011 HC RX IP 250 OP 636: Mod: JZ | Performed by: INTERNAL MEDICINE

## 2023-11-02 PROCEDURE — 36415 COLL VENOUS BLD VENIPUNCTURE: CPT | Performed by: INTERNAL MEDICINE

## 2023-11-02 PROCEDURE — 84132 ASSAY OF SERUM POTASSIUM: CPT | Performed by: STUDENT IN AN ORGANIZED HEALTH CARE EDUCATION/TRAINING PROGRAM

## 2023-11-02 PROCEDURE — 99231 SBSQ HOSP IP/OBS SF/LOW 25: CPT | Performed by: INTERNAL MEDICINE

## 2023-11-02 PROCEDURE — 250N000011 HC RX IP 250 OP 636: Performed by: STUDENT IN AN ORGANIZED HEALTH CARE EDUCATION/TRAINING PROGRAM

## 2023-11-02 PROCEDURE — 120N000001 HC R&B MED SURG/OB

## 2023-11-02 PROCEDURE — 250N000013 HC RX MED GY IP 250 OP 250 PS 637: Performed by: INTERNAL MEDICINE

## 2023-11-02 PROCEDURE — 85049 AUTOMATED PLATELET COUNT: CPT | Performed by: INTERNAL MEDICINE

## 2023-11-02 PROCEDURE — 82565 ASSAY OF CREATININE: CPT | Performed by: INTERNAL MEDICINE

## 2023-11-02 RX ADMIN — HEPARIN SODIUM 5000 UNITS: 5000 INJECTION, SOLUTION INTRAVENOUS; SUBCUTANEOUS at 21:55

## 2023-11-02 RX ADMIN — SERTRALINE HYDROCHLORIDE 50 MG: 50 TABLET ORAL at 10:15

## 2023-11-02 RX ADMIN — QUETIAPINE FUMARATE 25 MG: 25 TABLET ORAL at 21:55

## 2023-11-02 RX ADMIN — HEPARIN SODIUM 5000 UNITS: 5000 INJECTION, SOLUTION INTRAVENOUS; SUBCUTANEOUS at 10:15

## 2023-11-02 RX ADMIN — CETIRIZINE HYDROCHLORIDE 10 MG: 10 TABLET, FILM COATED ORAL at 10:15

## 2023-11-02 RX ADMIN — QUETIAPINE FUMARATE 25 MG: 25 TABLET ORAL at 16:58

## 2023-11-02 RX ADMIN — CEFTRIAXONE 2 G: 2 INJECTION, POWDER, FOR SOLUTION INTRAMUSCULAR; INTRAVENOUS at 15:00

## 2023-11-02 ASSESSMENT — ACTIVITIES OF DAILY LIVING (ADL)
ADLS_ACUITY_SCORE: 51
ADLS_ACUITY_SCORE: 51
ADLS_ACUITY_SCORE: 53
ADLS_ACUITY_SCORE: 51
ADLS_ACUITY_SCORE: 53
ADLS_ACUITY_SCORE: 51
ADLS_ACUITY_SCORE: 53
ADLS_ACUITY_SCORE: 51
ADLS_ACUITY_SCORE: 53
ADLS_ACUITY_SCORE: 51

## 2023-11-02 NOTE — PROGRESS NOTES
Care Management Follow Up    Length of Stay (days): 7    Expected Discharge Date: 11/06/2023     Concerns to be Addressed: all concerns addressed in this encounter     Patient plan of care discussed at interdisciplinary rounds: Yes    Anticipated Discharge Disposition: Assisted Living, Home Care     Anticipated Discharge Services:    Anticipated Discharge DME:      Patient/family educated on Medicare website which has current facility and service quality ratings: yes  Education Provided on the Discharge Plan: Yes  Patient/Family in Agreement with the Plan: yes    Additional Information:  [facility] memory care will be here today between 1-4 pm to assess pt.  Notified bedside nurse.    DISHA Flores, [facility]  Inpatient Care Coordination  [facility]  [phone]

## 2023-11-02 NOTE — PLAN OF CARE
Goal Outcome Evaluation:         Patient alert to self only. Unable to recall birthday. Patient fidgety. No aggressive behaviors. Scheduled seroquel changed to BID. First dose given at 1600. Patient resting comfortably at this time (1930). A2gbaw. Needs lots of queuing. Does not follow directions well. Impulsive. Incontinent of bladder. 2 continent bowel movements on shift. Plan for memory care.

## 2023-11-02 NOTE — PLAN OF CARE
Goal Outcome Evaluation:      Plan of Care Reviewed With: patient    Overall Patient Progress: improvingOverall Patient Progress: improving    Pt disoriented x3. Resting overnight, easily redirectable. VSS on RA. No pain. Incontinent of B/B. Mepilex on coccyx for prevention. A2 GB/w. Plan to discharge to [facility] on Monday.

## 2023-11-02 NOTE — PLAN OF CARE
Patient remained alert to self but disoriented to place, time, and situation. LUE PIV remains patent and intact. IV abx given, no issues. Patient remains on alarms for safety. Assist x2 gb and walker for ambulation. Intermittent incontinence of bowel and bladder. 2 BMS today. No pain reported. VSS on RA.

## 2023-11-02 NOTE — PROGRESS NOTES
"[facility]  Hospitalist Progress Note  Yoli Sanchez MD 11/02/2023    Reason for Stay (Diagnosis): E coli cystitis with dementia complicated by delirium          Assessment and Plan:      Summary of Stay: Jamie Meyer is a 77 year old male with a history of hlp, severe Alzheimer's dz at times with behavioral disturbance currently well controlled on quetiapine, shuffling gait and tremors complicated by frequent falls, BPH admitted on 10/26/2023 with weakness and tremor and found to have E coli cystitis complicated by sepsis     UA inflammatory and UCx with > 100 K  E coli R amp/gent/tmp-smx    Initial hospital course complicated by delirium but that is much improved on scheduled quetiapine.     His wife is no longer able to care for him at home and plans now are to discharge him to memory care on 11/6.    Problem List:   Sepsis 2/2 complicated cystitis (male gender)  E Coli UTI R to amp/gent/tmp-smx  Sepsis given wbc 19.5, fever 38.8, infectious encephalopathy, lactic acid 3.0        *resolved with IVF and treatment of infection   On day 8/10 ceftriaxone or oral equivalent     ?pna on CXR  No cough/sob so feel this is more likely atelectasis     Generalized weakness  Chronic ambulatory dysfunction - shuffling gait  Wife says that when he gets sick he \"forgets how to use his legs\"  PT/OT recommending LTC/TCU    Moderate to advanced dementia with hx of behavioral disturbances   Initially with significant delirium/confusion   He's been mostly cooperative these past several days but tends to get a bit restless in the afternoon which has prompted additional quetiapine at that time.   11/1 adjusted his quetiapine to 25 mg at 4 pm (new) and cont pta 25 mg at bedtime    DVT Prophylaxis: Heparin SQ  Code Status: DNR / DNI  Functional Status:  Diet: reg texture  [place]ell: not needed  Access PIV    Disposition Plan   Expected discharge on 11/6 to  memory care  once bed available.     Entered: Yoli Sanchez MD " "11/02/2023, 3:22 PM       Securely message with [employer] (more info)  Text page via [employer] Paging/Directory       I spent 30 minutes reviewing epic including prior labs/imaging/medical history and notes related to this encounter  In addition time was spent in interveiwing the patient, communicating with contacts, and medical decision making      Interval History (Subjective):      Feeling well denies cp sob n/v/d.  Tolerating po without difficulty                  Physical Exam:      Last Vital Signs:  /71 (BP Location: Left arm)   Pulse 77   Temp 97.7  F (36.5  C) (Temporal)   Resp 20   Ht 1.842 m (6' 0.52\")   Wt 98 kg (216 lb 0.8 oz)   SpO2 94%   BMI 28.88 kg/m      I/O:  Pleasant laying in bed nad looks stated age head nc/at sclera clear lungs cta b nl effort rrr no mrg belly s/nt/nd skin warm and dry no cyanosis or clubbing          Medications:      All current medications were reviewed with changes reflected in problem list.         Data:      All new lab and imaging data was reviewed.   Labs:  Recent Labs   Lab 11/02/23  0818 10/31/23  0727 10/30/23  0644   NA  --   --  140   POTASSIUM 4.1   < > 3.7   CHLORIDE  --   --  109*   CO2  --   --  22   ANIONGAP  --   --  9   GLC  --   --  124*   BUN  --   --  19.6   CR 0.73  --  0.72   GFRESTIMATED >90  --  >90   KATHARINA  --   --  8.5*    < > = values in this interval not displayed.     Recent Labs   Lab 11/02/23  0818 10/30/23  0644   WBC  --  7.2   HGB  --  12.2*   HCT  --  36.4*   MCV  --  95    188     Recent Labs   Lab 10/30/23  0644 10/28/23  0717 10/27/23  0749 10/26/23  1824   * 117* 136* 172*     Recent Labs   Lab 10/26/23  1824   *   ALT 36   ALKPHOS 68   BILITOTAL 1.0      Imaging:   Results for orders placed or performed during the hospital encounter of 10/26/23   Chest XR,  PA & LAT    Narrative    EXAM: XR CHEST 2 VIEWS  LOCATION: [facility]  DATE: 10/26/2023    INDICATION: fever  COMPARISON: None.      " Impression    IMPRESSION: Enlargement of the cardiac silhouette. Bibasilar opacities, differential includes atelectasis and developing pneumonia. Possible small bilateral pleural effusions. No pneumothorax. No acute bony abnormality.   CT Abdomen Pelvis w/o Contrast    Narrative    EXAM: CT ABDOMEN AND PELVIS WITHOUT CONTRAST  LOCATION: [facility]  DATE: 10/29/2023    INDICATION: Urinary tract infection, right-sided abdominal pain. Evaluate for stone, hydronephrosis etc.  Patient does have dementia.  COMPARISON: None.  TECHNIQUE: CT scan of the abdomen and pelvis was performed without IV contrast. Multiplanar reformats were obtained. Dose reduction techniques were used.  CONTRAST: None.    FINDINGS:   LOWER CHEST: Mild dependent probable atelectasis. Elevated right hemidiaphragm.    HEPATOBILIARY: No significant mass or bile duct dilatation. No calcified gallstones.     PANCREAS: No significant mass, duct dilatation, or inflammatory change.    SPLEEN: Normal size.    ADRENAL GLANDS: Mild thickening bilaterally.    KIDNEYS/BLADDER: No significant mass, stone, or hydronephrosis.    BOWEL: Normal.    LYMPH NODES: No adenopathy demonstrated in the absence of contrast.    VASCULATURE: There are moderate atherosclerotic changes of the visualized aorta and its branches. There is no evidence of aortic aneurysm.    PELVIC ORGANS: Mild prostatomegaly.    MUSCULOSKELETAL: No frankly destructive bony lesions.      Impression    IMPRESSION:   1.  No hydronephrosis or urolithiasis.  2.  No acute process demonstrated.

## 2023-11-03 ENCOUNTER — APPOINTMENT (OUTPATIENT)
Dept: PHYSICAL THERAPY | Facility: CLINIC | Age: 78
DRG: 872 | End: 2023-11-03
Payer: MEDICARE

## 2023-11-03 LAB
HOLD SPECIMEN: NORMAL
POTASSIUM SERPL-SCNC: 4.3 MMOL/L (ref 3.4–5.3)

## 2023-11-03 PROCEDURE — 250N000013 HC RX MED GY IP 250 OP 250 PS 637: Performed by: INTERNAL MEDICINE

## 2023-11-03 PROCEDURE — 97116 GAIT TRAINING THERAPY: CPT | Mod: GP

## 2023-11-03 PROCEDURE — 250N000011 HC RX IP 250 OP 636: Mod: JZ | Performed by: INTERNAL MEDICINE

## 2023-11-03 PROCEDURE — 99231 SBSQ HOSP IP/OBS SF/LOW 25: CPT | Performed by: INTERNAL MEDICINE

## 2023-11-03 PROCEDURE — 84132 ASSAY OF SERUM POTASSIUM: CPT | Performed by: INTERNAL MEDICINE

## 2023-11-03 PROCEDURE — 120N000001 HC R&B MED SURG/OB

## 2023-11-03 PROCEDURE — 97530 THERAPEUTIC ACTIVITIES: CPT | Mod: GP

## 2023-11-03 PROCEDURE — 250N000011 HC RX IP 250 OP 636: Performed by: STUDENT IN AN ORGANIZED HEALTH CARE EDUCATION/TRAINING PROGRAM

## 2023-11-03 PROCEDURE — 36415 COLL VENOUS BLD VENIPUNCTURE: CPT | Performed by: INTERNAL MEDICINE

## 2023-11-03 RX ADMIN — HEPARIN SODIUM 5000 UNITS: 5000 INJECTION, SOLUTION INTRAVENOUS; SUBCUTANEOUS at 09:09

## 2023-11-03 RX ADMIN — CETIRIZINE HYDROCHLORIDE 10 MG: 10 TABLET, FILM COATED ORAL at 09:08

## 2023-11-03 RX ADMIN — QUETIAPINE FUMARATE 25 MG: 25 TABLET ORAL at 15:22

## 2023-11-03 RX ADMIN — CEFTRIAXONE 2 G: 2 INJECTION, POWDER, FOR SOLUTION INTRAMUSCULAR; INTRAVENOUS at 14:04

## 2023-11-03 RX ADMIN — HEPARIN SODIUM 5000 UNITS: 5000 INJECTION, SOLUTION INTRAVENOUS; SUBCUTANEOUS at 21:20

## 2023-11-03 RX ADMIN — QUETIAPINE FUMARATE 25 MG: 25 TABLET ORAL at 21:20

## 2023-11-03 RX ADMIN — SERTRALINE HYDROCHLORIDE 50 MG: 50 TABLET ORAL at 09:08

## 2023-11-03 ASSESSMENT — ACTIVITIES OF DAILY LIVING (ADL)
ADLS_ACUITY_SCORE: 61
ADLS_ACUITY_SCORE: 59
ADLS_ACUITY_SCORE: 61
ADLS_ACUITY_SCORE: 61
ADLS_ACUITY_SCORE: 59
ADLS_ACUITY_SCORE: 61
ADLS_ACUITY_SCORE: 51
ADLS_ACUITY_SCORE: 59
ADLS_ACUITY_SCORE: 61
ADLS_ACUITY_SCORE: 61
ADLS_ACUITY_SCORE: 51
ADLS_ACUITY_SCORE: 59

## 2023-11-03 NOTE — PLAN OF CARE
Goal Outcome Evaluation:      Plan of Care Reviewed With: patient, caregiver               Pt is alert and orientated to self. Denies pain. CMS intact. Assist of 2, gb and walker. Incontinent of b/b. Regular diet. Plan to discharge Monday to [facility].

## 2023-11-03 NOTE — PROGRESS NOTES
Care Management Follow Up    Length of Stay (days): 8    Expected Discharge Date: 11/06/2023     Concerns to be Addressed: all concerns addressed in this encounter     Patient plan of care discussed at interdisciplinary rounds: Yes    Anticipated Discharge Disposition: Assisted Living, Home Care     Anticipated Discharge Services:    Anticipated Discharge DME:      Patient/family educated on Medicare website which has current facility and service quality ratings: yes  Education Provided on the Discharge Plan: Yes  Patient/Family in Agreement with the Plan: yes    Referrals Placed by CM/SW:    Private pay costs discussed: transportation costs    Additional Information:  [facility] scheduled WC transport for Monday 11:30-12:15, informed Ry at [facility] and spouse.  Yr stated orders for PT just needs to be on the orders, they arrange with their [facility] team.    DISHA Flores, [facility]  Inpatient Care Coordination  [facility]  [phone]      NORI HAMPTON

## 2023-11-03 NOTE — PROGRESS NOTES
"[facility]  Hospitalist Progress Note  Yoli Sanchez MD 11/03/2023    Reason for Stay (Diagnosis): E coli cystitis with dementia complicated by delirium          Assessment and Plan:      Summary of Stay: Jamie Meyer is a 77 year old male with a history of hlp, severe Alzheimer's dz at times with behavioral disturbance currently well controlled on quetiapine, shuffling gait and tremors complicated by frequent falls, BPH admitted on 10/26/2023 with weakness and tremor and found to have E coli cystitis complicated by sepsis     UA inflammatory and UCx with > 100 K  E coli R amp/gent/tmp-smx    Initial hospital course complicated by delirium but that is much improved on scheduled quetiapine.     His wife is no longer able to care for him at home and plans now are to discharge him to memory care on 11/6.    Problem List:   Sepsis 2/2 complicated cystitis (male gender)  E Coli UTI R to amp/gent/tmp-smx  Sepsis given wbc 19.5, fever 38.8, infectious encephalopathy, lactic acid 3.0        *resolved with IVF and treatment of infection   On day 9/10 ceftriaxone, orders in to stop after tomorrows dose     ?pna on CXR  No cough/sob so feel this is more likely atelectasis     Generalized weakness  Chronic ambulatory dysfunction - shuffling gait  Wife says that when he gets sick he \"forgets how to use his legs\"  PT/OT recommending LTC/TCU    Moderate to advanced dementia with hx of behavioral disturbances   Initially with significant delirium/confusion   He's been mostly cooperative these past several days but tends to get a bit restless in the afternoon which has prompted additional quetiapine at that time.   11/1 adjusted his quetiapine to 25 mg at 4 pm (new) and cont pta 25 mg at bedtime which has worked well.  He has not required any additional dosing     DVT Prophylaxis: Heparin SQ  Code Status: DNR / DNI  Functional Status:  Diet: reg texture  [place]ell: not needed  Access PIV    Disposition Plan   Expected " "discharge on 11/6 to  [facility]  once bed available.     Entered: Yoli Sanchez MD 11/03/2023, 4:32 PM       Securely message with [employer] (more info)  Text page via [employer] Paging/Directory       I spent 10 minutes reviewing epic including prior labs/imaging/medical history and notes related to this encounter  In addition time was spent in interveiwing the patient, communicating with contacts, and medical decision making    NO CHARGE VISIT      Interval History (Subjective):      Feeling well denies cp sob n/v/d.  Tolerating po without difficulty                  Physical Exam:      Last Vital Signs:  /74 (BP Location: Right arm)   Pulse 85   Temp 97.4  F (36.3  C) (Temporal)   Resp 18   Ht 1.842 m (6' 0.52\")   Wt 98 kg (216 lb 0.8 oz)   SpO2 96%   BMI 28.88 kg/m      I/O:  Pleasant laying in bed nad looks stated age head nc/at sclera clear lungs cta b nl effort rrr no mrg belly s/nt/nd skin warm and dry no cyanosis or clubbing          Medications:      All current medications were reviewed with changes reflected in problem list.         Data:      All new lab and imaging data was reviewed.   Labs:  Recent Labs   Lab 11/03/23  0642 11/02/23  0818 10/31/23  0727 10/30/23  0644   NA  --   --   --  140   POTASSIUM 4.3 4.1   < > 3.7   CHLORIDE  --   --   --  109*   CO2  --   --   --  22   ANIONGAP  --   --   --  9   GLC  --   --   --  124*   BUN  --   --   --  19.6   CR  --  0.73  --  0.72   GFRESTIMATED  --  >90  --  >90   KATHARINA  --   --   --  8.5*    < > = values in this interval not displayed.     Recent Labs   Lab 11/02/23  0818 10/30/23  0644   WBC  --  7.2   HGB  --  12.2*   HCT  --  36.4*   MCV  --  95    188     Recent Labs   Lab 10/30/23  0644 10/28/23  0717   * 117*          Imaging:   Results for orders placed or performed during the hospital encounter of 10/26/23   Chest XR,  PA & LAT    Narrative    EXAM: XR CHEST 2 VIEWS  LOCATION: [facility]  DATE: " 10/26/2023    INDICATION: fever  COMPARISON: None.      Impression    IMPRESSION: Enlargement of the cardiac silhouette. Bibasilar opacities, differential includes atelectasis and developing pneumonia. Possible small bilateral pleural effusions. No pneumothorax. No acute bony abnormality.   CT Abdomen Pelvis w/o Contrast    Narrative    EXAM: CT ABDOMEN AND PELVIS WITHOUT CONTRAST  LOCATION: [facility]  DATE: 10/29/2023    INDICATION: Urinary tract infection, right-sided abdominal pain. Evaluate for stone, hydronephrosis etc.  Patient does have dementia.  COMPARISON: None.  TECHNIQUE: CT scan of the abdomen and pelvis was performed without IV contrast. Multiplanar reformats were obtained. Dose reduction techniques were used.  CONTRAST: None.    FINDINGS:   LOWER CHEST: Mild dependent probable atelectasis. Elevated right hemidiaphragm.    HEPATOBILIARY: No significant mass or bile duct dilatation. No calcified gallstones.     PANCREAS: No significant mass, duct dilatation, or inflammatory change.    SPLEEN: Normal size.    ADRENAL GLANDS: Mild thickening bilaterally.    KIDNEYS/BLADDER: No significant mass, stone, or hydronephrosis.    BOWEL: Normal.    LYMPH NODES: No adenopathy demonstrated in the absence of contrast.    VASCULATURE: There are moderate atherosclerotic changes of the visualized aorta and its branches. There is no evidence of aortic aneurysm.    PELVIC ORGANS: Mild prostatomegaly.    MUSCULOSKELETAL: No frankly destructive bony lesions.      Impression    IMPRESSION:   1.  No hydronephrosis or urolithiasis.  2.  No acute process demonstrated.

## 2023-11-03 NOTE — PLAN OF CARE
Goal Outcome Evaluation:      Plan of Care Reviewed With: patient    Overall Patient Progress: improvingOverall Patient Progress: improving    Pt disoriented x3, only oriented to self. Resting overnight, easily redirectable. VSS on RA. No pain. Incontinent of urine overnight. A2 GB/w. Plan to discharge to [facility] on Monday.

## 2023-11-04 LAB
ALBUMIN UR-MCNC: NEGATIVE MG/DL
APPEARANCE UR: CLEAR
BILIRUB UR QL STRIP: NEGATIVE
COLOR UR AUTO: NORMAL
GLUCOSE UR STRIP-MCNC: NEGATIVE MG/DL
HGB UR QL STRIP: NEGATIVE
HOLD SPECIMEN: NORMAL
KETONES UR STRIP-MCNC: NEGATIVE MG/DL
LEUKOCYTE ESTERASE UR QL STRIP: NEGATIVE
NITRATE UR QL: NEGATIVE
PH UR STRIP: 6.5 [PH] (ref 5–7)
POTASSIUM SERPL-SCNC: 4.9 MMOL/L (ref 3.4–5.3)
RBC URINE: <1 /HPF
SP GR UR STRIP: 1.01 (ref 1–1.03)
UROBILINOGEN UR STRIP-MCNC: NORMAL MG/DL
WBC URINE: 2 /HPF

## 2023-11-04 PROCEDURE — 250N000011 HC RX IP 250 OP 636: Performed by: STUDENT IN AN ORGANIZED HEALTH CARE EDUCATION/TRAINING PROGRAM

## 2023-11-04 PROCEDURE — 250N000011 HC RX IP 250 OP 636: Mod: JZ | Performed by: INTERNAL MEDICINE

## 2023-11-04 PROCEDURE — 84132 ASSAY OF SERUM POTASSIUM: CPT | Performed by: INTERNAL MEDICINE

## 2023-11-04 PROCEDURE — 250N000013 HC RX MED GY IP 250 OP 250 PS 637: Performed by: INTERNAL MEDICINE

## 2023-11-04 PROCEDURE — 36415 COLL VENOUS BLD VENIPUNCTURE: CPT | Performed by: INTERNAL MEDICINE

## 2023-11-04 PROCEDURE — 81001 URINALYSIS AUTO W/SCOPE: CPT | Performed by: INTERNAL MEDICINE

## 2023-11-04 PROCEDURE — 99231 SBSQ HOSP IP/OBS SF/LOW 25: CPT | Performed by: INTERNAL MEDICINE

## 2023-11-04 PROCEDURE — 120N000001 HC R&B MED SURG/OB

## 2023-11-04 RX ADMIN — CETIRIZINE HYDROCHLORIDE 10 MG: 10 TABLET, FILM COATED ORAL at 08:38

## 2023-11-04 RX ADMIN — QUETIAPINE FUMARATE 25 MG: 25 TABLET ORAL at 16:50

## 2023-11-04 RX ADMIN — HEPARIN SODIUM 5000 UNITS: 5000 INJECTION, SOLUTION INTRAVENOUS; SUBCUTANEOUS at 10:12

## 2023-11-04 RX ADMIN — HEPARIN SODIUM 5000 UNITS: 5000 INJECTION, SOLUTION INTRAVENOUS; SUBCUTANEOUS at 21:53

## 2023-11-04 RX ADMIN — SERTRALINE HYDROCHLORIDE 50 MG: 50 TABLET ORAL at 08:38

## 2023-11-04 RX ADMIN — QUETIAPINE FUMARATE 25 MG: 25 TABLET ORAL at 21:53

## 2023-11-04 RX ADMIN — CEFTRIAXONE 2 G: 2 INJECTION, POWDER, FOR SOLUTION INTRAMUSCULAR; INTRAVENOUS at 14:26

## 2023-11-04 ASSESSMENT — ACTIVITIES OF DAILY LIVING (ADL)
ADLS_ACUITY_SCORE: 61
ADLS_ACUITY_SCORE: 59
ADLS_ACUITY_SCORE: 61
ADLS_ACUITY_SCORE: 61
ADLS_ACUITY_SCORE: 59

## 2023-11-04 NOTE — PLAN OF CARE
Goal Outcome Evaluation:      Plan of Care Reviewed With: patient    Overall Patient Progress: improving       Pt Alert oriented to self. Confused. VSS RA. Incontinent of bladder several times this shift. No BM noted. redness to coccyx. No open skin. Barrier cream applied and Mepilex placed. No briefs in bed. On k protocol recheck scheduled this morning. Pt to discharge back to his [facility] care Monday.         11/5/2023 0700-1100pm    No change from previous shift. Rash and Redness to groin area improving with the ordered miconazole powder.

## 2023-11-04 NOTE — PLAN OF CARE
Goal Outcome Evaluation:      Plan of Care Reviewed With: patient, spouse               Pt is alert and orientated to self. Denies pain. PIV access lost. CMS intact. Assist of 2, gait belt. Incontinent of b/b. Regular diet, needs assistance with eating. Discharge to [facility] on 11/6/23.

## 2023-11-04 NOTE — PROGRESS NOTES
"[facility]  Hospitalist Progress Note  Yoli Sanchez MD 11/04/2023    Reason for Stay (Diagnosis): E coli cystitis with dementia complicated by delirium          Assessment and Plan:      Summary of Stay: Jamie Meyer is a 77 year old male with a history of hlp, severe Alzheimer's dz at times with behavioral disturbance currently well controlled on quetiapine, shuffling gait and tremors complicated by frequent falls, BPH admitted on 10/26/2023 with weakness and tremor and found to have E coli cystitis complicated by sepsis     UA inflammatory and UCx with > 100 K  E coli R amp/gent/tmp-smx    Initial hospital course complicated by delirium but that is much improved on scheduled quetiapine.     His wife is no longer able to care for him at home and plans now are to discharge him to memory care on 11/6.    Problem List:   Sepsis 2/2 complicated cystitis (male gender)  E Coli UTI R to amp/gent/tmp-smx  Sepsis given wbc 19.5, fever 38.8, infectious encephalopathy, lactic acid 3.0        *resolved with IVF and treatment of infection   On day 10/10 ceftriaxone    ?pna on CXR  No cough/sob so feel this is more likely atelectasis     Generalized weakness  Chronic ambulatory dysfunction - shuffling gait  Wife says that when he gets sick he \"forgets how to use his legs\"  PT/OT recommending LTC/TCU  -he'll be discharged to LTC on 11/6    Moderate to advanced dementia with hx of behavioral disturbances   Initially with significant delirium/confusion   He's been mostly cooperative these past several days but tends to get a bit restless in the afternoon which has prompted additional quetiapine at that time.   11/1 adjusted his quetiapine to 25 mg at 4 pm (new) and cont pta 25 mg at bedtime which has worked well.  He has not required any additional dosing     DVT Prophylaxis: Heparin SQ  Code Status: DNR / DNI  Functional Status:  Diet: reg texture  [place]ell: not needed  Access PIV    Disposition Plan   Expected " "discharge on 11/6 to  memory care  once bed available.     Entered: Yoli Sanchez MD 11/04/2023, 5:16 PM       Securely message with [employer] (more info)  Text page via [employer] Paging/[employer]       D/w wife at the bedside    I spent 25 minutes reviewing epic including prior labs/imaging/medical history and notes related to this encounter  In addition time was spent in interveiwing the patient, communicating with contacts, and medical decision making          Interval History (Subjective):      Feeling well denies cp sob n/v/d.  Tolerating po without difficulty  Little more antsy this afternoon and wants to walk around.  Typically will pace around the house.                       Physical Exam:      Last Vital Signs:  /61 (BP Location: Left arm)   Pulse 84   Temp 97.2  F (36.2  C) (Temporal)   Resp 18   Ht 1.842 m (6' 0.52\")   Wt 98 kg (216 lb 0.8 oz)   SpO2 90%   BMI 28.88 kg/m      I/O:  Pleasant laying in bed nad looks stated age head nc/at sclera clear lungs cta b nl effort rrr no mrg belly s/nt/nd skin warm and dry no cyanosis or clubbing          Medications:      All current medications were reviewed with changes reflected in problem list.         Data:      All new lab and imaging data was reviewed.   Labs:  Recent Labs   Lab 11/04/23  0801 11/03/23  0642 11/02/23  0818 10/31/23  0727 10/30/23  0644   NA  --   --   --   --  140   POTASSIUM 4.9   < > 4.1   < > 3.7   CHLORIDE  --   --   --   --  109*   CO2  --   --   --   --  22   ANIONGAP  --   --   --   --  9   GLC  --   --   --   --  124*   BUN  --   --   --   --  19.6   CR  --   --  0.73  --  0.72   GFRESTIMATED  --   --  >90  --  >90   KATHARINA  --   --   --   --  8.5*    < > = values in this interval not displayed.     Recent Labs   Lab 11/02/23  0818 10/30/23  0644   WBC  --  7.2   HGB  --  12.2*   HCT  --  36.4*   MCV  --  95    188     Recent Labs   Lab 10/30/23  0644   [facility] 124*          Imaging:   Results for orders placed or performed " during the hospital encounter of 10/26/23   Chest XR,  PA & LAT    Narrative    EXAM: XR CHEST 2 VIEWS  LOCATION: [facility]  DATE: 10/26/2023    INDICATION: fever  COMPARISON: None.      Impression    IMPRESSION: Enlargement of the cardiac silhouette. Bibasilar opacities, differential includes atelectasis and developing pneumonia. Possible small bilateral pleural effusions. No pneumothorax. No acute bony abnormality.   CT Abdomen Pelvis w/o Contrast    Narrative    EXAM: CT ABDOMEN AND PELVIS WITHOUT CONTRAST  LOCATION: [facility]  DATE: 10/29/2023    INDICATION: Urinary tract infection, right-sided abdominal pain. Evaluate for stone, hydronephrosis etc.  Patient does have dementia.  COMPARISON: None.  TECHNIQUE: CT scan of the abdomen and pelvis was performed without IV contrast. Multiplanar reformats were obtained. Dose reduction techniques were used.  CONTRAST: None.    FINDINGS:   LOWER CHEST: Mild dependent probable atelectasis. Elevated right hemidiaphragm.    HEPATOBILIARY: No significant mass or bile duct dilatation. No calcified gallstones.     PANCREAS: No significant mass, duct dilatation, or inflammatory change.    SPLEEN: Normal size.    ADRENAL GLANDS: Mild thickening bilaterally.    KIDNEYS/BLADDER: No significant mass, stone, or hydronephrosis.    BOWEL: Normal.    LYMPH NODES: No adenopathy demonstrated in the absence of contrast.    VASCULATURE: There are moderate atherosclerotic changes of the visualized aorta and its branches. There is no evidence of aortic aneurysm.    PELVIC ORGANS: Mild prostatomegaly.    MUSCULOSKELETAL: No frankly destructive bony lesions.      Impression    IMPRESSION:   1.  No hydronephrosis or urolithiasis.  2.  No acute process demonstrated.

## 2023-11-05 LAB
CREAT SERPL-MCNC: 0.74 MG/DL (ref 0.67–1.17)
EGFRCR SERPLBLD CKD-EPI 2021: >90 ML/MIN/1.73M2
PLATELET # BLD AUTO: 300 10E3/UL (ref 150–450)
POTASSIUM SERPL-SCNC: 4.2 MMOL/L (ref 3.4–5.3)

## 2023-11-05 PROCEDURE — 85049 AUTOMATED PLATELET COUNT: CPT | Performed by: INTERNAL MEDICINE

## 2023-11-05 PROCEDURE — 250N000013 HC RX MED GY IP 250 OP 250 PS 637: Performed by: INTERNAL MEDICINE

## 2023-11-05 PROCEDURE — 82565 ASSAY OF CREATININE: CPT | Performed by: INTERNAL MEDICINE

## 2023-11-05 PROCEDURE — 84132 ASSAY OF SERUM POTASSIUM: CPT | Performed by: STUDENT IN AN ORGANIZED HEALTH CARE EDUCATION/TRAINING PROGRAM

## 2023-11-05 PROCEDURE — 250N000011 HC RX IP 250 OP 636: Performed by: INTERNAL MEDICINE

## 2023-11-05 PROCEDURE — 36415 COLL VENOUS BLD VENIPUNCTURE: CPT | Performed by: INTERNAL MEDICINE

## 2023-11-05 PROCEDURE — G0008 ADMIN INFLUENZA VIRUS VAC: HCPCS | Performed by: INTERNAL MEDICINE

## 2023-11-05 PROCEDURE — 99232 SBSQ HOSP IP/OBS MODERATE 35: CPT | Performed by: INTERNAL MEDICINE

## 2023-11-05 PROCEDURE — 120N000001 HC R&B MED SURG/OB

## 2023-11-05 PROCEDURE — 90662 IIV NO PRSV INCREASED AG IM: CPT | Performed by: INTERNAL MEDICINE

## 2023-11-05 PROCEDURE — 250N000011 HC RX IP 250 OP 636: Performed by: STUDENT IN AN ORGANIZED HEALTH CARE EDUCATION/TRAINING PROGRAM

## 2023-11-05 RX ADMIN — HEPARIN SODIUM 5000 UNITS: 5000 INJECTION, SOLUTION INTRAVENOUS; SUBCUTANEOUS at 10:31

## 2023-11-05 RX ADMIN — MICONAZOLE NITRATE: 20 POWDER TOPICAL at 10:25

## 2023-11-05 RX ADMIN — MICONAZOLE NITRATE: 20 POWDER TOPICAL at 21:05

## 2023-11-05 RX ADMIN — HEPARIN SODIUM 5000 UNITS: 5000 INJECTION, SOLUTION INTRAVENOUS; SUBCUTANEOUS at 21:04

## 2023-11-05 RX ADMIN — SERTRALINE HYDROCHLORIDE 50 MG: 50 TABLET ORAL at 07:56

## 2023-11-05 RX ADMIN — QUETIAPINE FUMARATE 25 MG: 25 TABLET ORAL at 21:04

## 2023-11-05 RX ADMIN — QUETIAPINE FUMARATE 25 MG: 25 TABLET ORAL at 17:12

## 2023-11-05 RX ADMIN — CETIRIZINE HYDROCHLORIDE 10 MG: 10 TABLET, FILM COATED ORAL at 07:56

## 2023-11-05 RX ADMIN — INFLUENZA A VIRUS A/VICTORIA/4897/2022 IVR-238 (H1N1) ANTIGEN (FORMALDEHYDE INACTIVATED), INFLUENZA A VIRUS A/DARWIN/9/2021 SAN-010 (H3N2) ANTIGEN (FORMALDEHYDE INACTIVATED), INFLUENZA B VIRUS B/PHUKET/3073/2013 ANTIGEN (FORMALDEHYDE INACTIVATED), AND INFLUENZA B VIRUS B/MICHIGAN/01/2021 ANTIGEN (FORMALDEHYDE INACTIVATED) 0.7 ML: 60; 60; 60; 60 INJECTION, SUSPENSION INTRAMUSCULAR at 10:31

## 2023-11-05 ASSESSMENT — ACTIVITIES OF DAILY LIVING (ADL)
ADLS_ACUITY_SCORE: 55
ADLS_ACUITY_SCORE: 59
ADLS_ACUITY_SCORE: 55
ADLS_ACUITY_SCORE: 59
ADLS_ACUITY_SCORE: 55

## 2023-11-05 NOTE — PROGRESS NOTES
"[facility]  Hospitalist Progress Note  Emanuel Oleary MD 11/05/2023    Reason for Stay (Diagnosis): E coli cystitis with dementia complicated by delirium          Assessment and Plan:      Jamie Meyer is a 77 year old male with a history of hyperlipidemia, severe Alzheimer's dementia at times with behavioral disturbance currently well controlled on quetiapine, shuffling gait and tremors complicated by frequent falls, BPH admitted on 10/26/2023 with weakness and tremor and found to have E coli cystitis complicated by sepsis. ON workup, urine analysis was inflammatory and culture with > 100 K  E coli R amp/gent/tmp-smx.  He was treated with IV rocephgin for 10 days.  Initial hospital course complicated by delirium but that is much improved on scheduled quetiapine. His wife is no longer able to care for him at home and plans now are to discharge him to memory care on 11/6.    Problem List:   Sepsis due to complicated cystitis (male gender) with E Coli UTI  Patient presented with sepsis due to a UTI.  He was found to have E. coli that was resistant to amp/gent/tmp-smx.  He was septic with leukocytosis,  wbc 19.5, fever 38.8, infectious encephalopathy, lactic acid 3.0.  He was placed on IV fluids along with IV Rocephin.  Symptoms improved with treatment.    Possible pneumonia on CXR  Chest x-ray was abnormal but likely represented more atelectasis than an actual pneumonia.  He had no symptoms of a cough nor shortness of breath.  He was treated with IV Rocephin however for UTI.        Generalized weakness, deconditioning, recurrent falls, chronic ambulatory dysfunction , shuffling gait  Wife says that when he gets sick he \"forgets how to use his legs\".  Patient was seen by PT and OT and is recommending long-term care.  I wonder if the patient actually has underlying Lewy body dementia rather than Alzheimer's given his Parkinson-like symptoms.  He is most certainly put him at a higher risk for falls.  Will " "be going to memory care on 11/6/2023.    Moderate to advanced dementia with hx of behavioral disturbances, alzheimer's vs possible lewy body   Initially with significant delirium/confusion in the setting of UTI and sepsis.  He's been mostly cooperative these past several days but tends to get a bit restless in the afternoon which has prompted additional quetiapine at that time.  11/1 adjusted his quetiapine to 25 mg at 4 pm (new) and cont pta 25 mg at bedtime which has worked well.  He has not required any additional dosing.  Patient be discharged to memory care.  Per the chart, the patient has Alzheimer's disease but I wonder if he has Lewy body disease given his Parkinson-like symptoms of shuffling gait, recurrent falls.  Appropriately has not any other medications for this other than his Zoloft and Seroquel.    DVT Prophylaxis: Heparin SQ  Code Status: DNR / DNI  Functional Status: Demented, requiring memory care  Diet: reg texture  [place]: not needed  Access PIV    Disposition Plan   Expected discharge on 11/6 to  memory care  once bed available.    Discussed with bedside nursing       Entered: Cristian Oleary MD 11/05/2023, 11:55 AM       Securely message with [employer] (more info)  Text page via [employer] Paging/Directory               Interval History (Subjective):      Patient new to me today.  I found the patient naked and confused lying in bed.  He had his gown wrapped around his hands.  Most certainly is confused.  Pleasant.  Had no new complaints.  Knows that he is confused and recognizes it.                      Physical Exam:      Last Vital Signs:  /74 (BP Location: Left arm)   Pulse 64   Temp 97.9  F (36.6  C) (Temporal)   Resp 20   Ht 1.842 m (6' 0.52\")   Wt 98.2 kg (216 lb 7.9 oz)   SpO2 96%   BMI 28.94 kg/m      General: Pleasant laying in bed naked, is confused, no apparent distress and pleasant, appears elderly and frail  HEENT:  MMM  LUNGS:  lungs ctab, nl effort   Cardiology: rrr no mrg "   Abdomen:  belly s/nt/nd   EXTR:  ARCHER< no edema.         Medications:      All current medications were reviewed with changes reflected in problem list.         Data:      All new lab and imaging data was reviewed.   Labs:  Recent Labs   Lab 11/05/23  0739 10/31/23  0727 10/30/23  0644   NA  --   --  140   POTASSIUM 4.2   < > 3.7   CHLORIDE  --   --  109*   CO2  --   --  22   ANIONGAP  --   --  9   GLC  --   --  124*   BUN  --   --  19.6   CR 0.74   < > 0.72   GFRESTIMATED >90   < > >90   KATHARINA  --   --  8.5*    < > = values in this interval not displayed.     Recent Labs   Lab 11/05/23  0739 11/02/23  0818 10/30/23  0644   WBC  --   --  7.2   HGB  --   --  12.2*   HCT  --   --  36.4*   MCV  --   --  95      < > 188    < > = values in this interval not displayed.     Recent Labs   Lab 10/30/23  0644   *          Imaging:   Results for orders placed or performed during the hospital encounter of 10/26/23   Chest XR,  PA & LAT    Narrative    EXAM: XR CHEST 2 VIEWS  LOCATION: [facility]  DATE: 10/26/2023    INDICATION: fever  COMPARISON: None.      Impression    IMPRESSION: Enlargement of the cardiac silhouette. Bibasilar opacities, differential includes atelectasis and developing pneumonia. Possible small bilateral pleural effusions. No pneumothorax. No acute bony abnormality.   CT Abdomen Pelvis w/o Contrast    Narrative    EXAM: CT ABDOMEN AND PELVIS WITHOUT CONTRAST  LOCATION: [facility]  DATE: 10/29/2023    INDICATION: Urinary tract infection, right-sided abdominal pain. Evaluate for stone, hydronephrosis etc.  Patient does have dementia.  COMPARISON: None.  TECHNIQUE: CT scan of the abdomen and pelvis was performed without IV contrast. Multiplanar reformats were obtained. Dose reduction techniques were used.  CONTRAST: None.    FINDINGS:   LOWER CHEST: Mild dependent probable atelectasis. Elevated right hemidiaphragm.    HEPATOBILIARY: No significant mass  or bile duct dilatation. No calcified gallstones.     PANCREAS: No significant mass, duct dilatation, or inflammatory change.    SPLEEN: Normal size.    ADRENAL GLANDS: Mild thickening bilaterally.    KIDNEYS/BLADDER: No significant mass, stone, or hydronephrosis.    BOWEL: Normal.    LYMPH NODES: No adenopathy demonstrated in the absence of contrast.    VASCULATURE: There are moderate atherosclerotic changes of the visualized aorta and its branches. There is no evidence of aortic aneurysm.    PELVIC ORGANS: Mild prostatomegaly.    MUSCULOSKELETAL: No frankly destructive bony lesions.      Impression    IMPRESSION:   1.  No hydronephrosis or urolithiasis.  2.  No acute process demonstrated.

## 2023-11-05 NOTE — PLAN OF CARE
Goal Outcome Evaluation:      Plan of Care Reviewed With: patient    Overall Patient Progress: improving    Pt Alert oriented to self. Confused. Incontinent of bowel and bladder. VSS RA. Redness to perineum, groin and coccyx. Barrier cream applied. Pt on the K protocol recheck this morning. Pt to discharge back to [facility] 11/6/2023.

## 2023-11-06 VITALS
DIASTOLIC BLOOD PRESSURE: 64 MMHG | BODY MASS INDEX: 28.37 KG/M2 | HEART RATE: 73 BPM | HEIGHT: 73 IN | RESPIRATION RATE: 18 BRPM | SYSTOLIC BLOOD PRESSURE: 114 MMHG | TEMPERATURE: 97.9 F | WEIGHT: 214.07 LBS | OXYGEN SATURATION: 93 %

## 2023-11-06 LAB — POTASSIUM SERPL-SCNC: 4.4 MMOL/L (ref 3.4–5.3)

## 2023-11-06 PROCEDURE — 250N000011 HC RX IP 250 OP 636: Performed by: STUDENT IN AN ORGANIZED HEALTH CARE EDUCATION/TRAINING PROGRAM

## 2023-11-06 PROCEDURE — 36415 COLL VENOUS BLD VENIPUNCTURE: CPT | Performed by: STUDENT IN AN ORGANIZED HEALTH CARE EDUCATION/TRAINING PROGRAM

## 2023-11-06 PROCEDURE — 84132 ASSAY OF SERUM POTASSIUM: CPT | Performed by: STUDENT IN AN ORGANIZED HEALTH CARE EDUCATION/TRAINING PROGRAM

## 2023-11-06 PROCEDURE — 99238 HOSP IP/OBS DSCHRG MGMT 30/<: CPT | Performed by: INTERNAL MEDICINE

## 2023-11-06 PROCEDURE — 250N000013 HC RX MED GY IP 250 OP 250 PS 637: Performed by: INTERNAL MEDICINE

## 2023-11-06 RX ORDER — QUETIAPINE FUMARATE 25 MG/1
25 TABLET, FILM COATED ORAL EVERY 12 HOURS PRN
Start: 2023-11-06 | End: 2023-11-06

## 2023-11-06 RX ORDER — CETIRIZINE HYDROCHLORIDE 10 MG/1
10 TABLET ORAL DAILY
Qty: 3 TABLET | Refills: 0 | Status: SHIPPED | OUTPATIENT
Start: 2023-11-06 | End: 2023-11-09

## 2023-11-06 RX ORDER — QUETIAPINE FUMARATE 25 MG/1
25 TABLET, FILM COATED ORAL EVERY 12 HOURS PRN
Qty: 6 TABLET | Refills: 0 | Status: SHIPPED | OUTPATIENT
Start: 2023-11-06

## 2023-11-06 RX ORDER — QUETIAPINE FUMARATE 25 MG/1
25 TABLET, FILM COATED ORAL 2 TIMES DAILY
Start: 2023-11-06 | End: 2023-11-06

## 2023-11-06 RX ORDER — QUETIAPINE FUMARATE 25 MG/1
25 TABLET, FILM COATED ORAL 2 TIMES DAILY
Qty: 6 TABLET | Refills: 0 | Status: SHIPPED | OUTPATIENT
Start: 2023-11-06 | End: 2023-11-09

## 2023-11-06 RX ADMIN — MICONAZOLE NITRATE: 20 POWDER TOPICAL at 08:53

## 2023-11-06 RX ADMIN — SERTRALINE HYDROCHLORIDE 50 MG: 50 TABLET ORAL at 08:52

## 2023-11-06 RX ADMIN — CETIRIZINE HYDROCHLORIDE 10 MG: 10 TABLET, FILM COATED ORAL at 08:52

## 2023-11-06 RX ADMIN — HEPARIN SODIUM 5000 UNITS: 5000 INJECTION, SOLUTION INTRAVENOUS; SUBCUTANEOUS at 10:09

## 2023-11-06 ASSESSMENT — ACTIVITIES OF DAILY LIVING (ADL)
ADLS_ACUITY_SCORE: 59
ADLS_ACUITY_SCORE: 61
ADLS_ACUITY_SCORE: 59
ADLS_ACUITY_SCORE: 58
ADLS_ACUITY_SCORE: 59
ADLS_ACUITY_SCORE: 61

## 2023-11-06 NOTE — PLAN OF CARE
Goal Outcome Evaluation:      Plan of Care Reviewed With: patient, spouse               Pt is alert to self. Denies pain. CMS intact. Assist of 2 with GB. Incontinent of b/b. Received flu shot. Regular diet, requiring assistance with eating.     Discharge tomorrow to [facility], transport planned for 11:30-12:15.

## 2023-11-06 NOTE — PLAN OF CARE
"Physical Therapy Discharge Summary    Reason for therapy discharge:    Discharged to home with home therapy.    Progress towards therapy goal(s). See goals on Care Plan in [facility] electronic health record for goal details.  Goals partially met.  Barriers to achieving goals:   discharge from facility.    Therapy recommendation(s):    Continued therapy is recommended.  Rationale/Recommendations:  Per prior PT recommendation, \"Pt currently below reported baseline for mobility. Is typically able to ambulate short distances with FWW, lives with spouse who assists with all cares. Pt having difficulty with walker management and significant posterior lean, able to ambulate 40' this date. Currently rec TCU to maximize safety and IND with mobility (pt would benefit from memory care TCU if available)\".      "

## 2023-11-06 NOTE — PLAN OF CARE
Goal Outcome Evaluation:       Pt confused, alert to self only. During night pt takes off his gown and wrappers around his hands. On Ra. Denies pain during shift. Incontinent bowel and bladder. redness perineum area,barrier cream applied. On potassium protocol.plan of care ongoing.

## 2023-11-06 NOTE — PLAN OF CARE
Goal Outcome Evaluation:      Plan of Care Reviewed With: spouse, patient    Overall Patient Progress: improvingOverall Patient Progress: improving    Alert, oriented to self.  Calm and pleasant, cooperative but needs repeated directions at times.  Denies pain.  Tolerating diet, feeds self after tray set up. Incontinent of bladder.  BM today. Ambulated to bathroom and up in chair with assist 1/gait belt/ walker.  Does lift and carry walker even with verbal cues to use correctly.  Lilian area reddened, powder applied.  Discharging to [facility].  Discharge instructions/meds given to wife.  No IV.  All personal belongings with patient at time of discharge.  Transported via medical transport.

## 2023-11-06 NOTE — DISCHARGE SUMMARY
[facility]  Hospitalist Discharge Summary      Date of Admission:  10/26/2023  Date of Discharge:  11/6/2023  Discharging Provider: Cristian Oleary MD  Discharge Service: Hospitalist Service  Primary Care Physician   WILMER TORRES    Discharge Diagnoses   Sepsis due to complicated cystitis  E. coli UTI  Generalized weakness  Failure to thrive  Deconditioning  Recurrent falls  Advanced dementia with behavior disturbances    Hospital Course   Jamie Meyer is a 77 year old male with a history of hyperlipidemia, severe Alzheimer's dementia at times with behavioral disturbance currently well controlled on quetiapine, shuffling gait and tremors complicated by frequent falls, BPH admitted on 10/26/2023 with weakness and tremor and found to have E coli cystitis complicated by sepsis. ON workup, urine analysis was inflammatory and culture with > 100 K  E coli R amp/gent/tmp-smx.  He was treated with IV rocephgin for 10 days.  Initial hospital course complicated by delirium but that is much improved on scheduled quetiapine. His wife is no longer able to care for him at home and plans now are to discharge him to memory care on 11/6.     Problem List:   Sepsis due to complicated cystitis (male gender) with E Coli UTI  Patient presented with sepsis due to a UTI.  He was found to have E. coli that was resistant to amp/gent/tmp-smx.  He was septic with leukocytosis,  wbc 19.5, fever 38.8, infectious encephalopathy, lactic acid 3.0.  He was placed on IV fluids along with IV Rocephin.  Symptoms improved with treatment.     Possible pneumonia on CXR  Chest x-ray was abnormal but likely represented more atelectasis than an actual pneumonia.  He had no symptoms of a cough nor shortness of breath.  He was treated with IV Rocephin however for UTI.    No ongoing signs of shortness of breath, cough, or sputum production.     Generalized weakness, deconditioning, recurrent falls, chronic ambulatory dysfunction ,  "shuffling gait, failure to thrive  Wife says that when he gets sick he \"forgets how to use his legs\".  Patient was seen by PT and OT and is recommending long-term care.  I wonder if the patient actually has underlying Lewy body dementia rather than Alzheimer's given his Parkinson-like symptoms.  He is most certainly put him at a higher risk for falls.  Will be going to memory care on 11/6/2023.     Moderate to advanced dementia with hx of behavioral disturbances, alzheimer's vs possible lewy body   Initially with significant delirium/confusion in the setting of UTI and sepsis.  He's been mostly cooperative these past several days but tends to get a bit restless in the afternoon which has prompted additional quetiapine at that time.  11/1 adjusted his quetiapine to 25 mg at 4 pm (new) and cont pta 25 mg at bedtime which has worked well.  He has not required any additional dosing.  Patient be discharged to memory care.  Per the chart, the patient has Alzheimer's disease but I wonder if he has Lewy body disease given his Parkinson-like symptoms of shuffling gait, recurrent falls.  Appropriately has not any other medications for this other than his Zoloft and Seroquel.    Clinically Significant Risk Factors     # Overweight: Estimated body mass index is 28.62 kg/m  as calculated from the following:    Height as of this encounter: 1.842 m (6' 0.52\").    Weight as of this encounter: 97.1 kg (214 lb 1.1 oz).       Significant Results and Procedures   Most Recent 3 CBC's:  Recent Labs   Lab Test 11/05/23  0739 11/02/23  0818 10/30/23  0644 10/28/23  0717 10/27/23  0749   WBC  --   --  7.2 8.3 14.5*   HGB  --   --  12.2* 12.5* 11.7*   MCV  --   --  95 95 95    262 188 147* 142*     Most Recent 3 BMP's:  Recent Labs   Lab Test 11/06/23  0708 11/05/23  0739 11/04/23  0801 11/03/23  0642 11/02/23  0818 10/31/23  0727 10/30/23  0644 10/29/23  0737 10/28/23  0717 10/27/23  0749   NA  --   --   --   --   --   --  140  --  " 136 137   POTASSIUM 4.4 4.2 4.9   < > 4.1   < > 3.7   < > 3.8 3.9   CHLORIDE  --   --   --   --   --   --  109*  --  105 107   CO2  --   --   --   --   --   --  22  --  21* 21*   BUN  --   --   --   --   --   --  19.6  --  17.8 20.6   CR  --  0.74  --   --  0.73  --  0.72  --  0.80 0.90   ANIONGAP  --   --   --   --   --   --  9  --  10 9   KATHARINA  --   --   --   --   --   --  8.5*  --  8.3* 8.0*   GLC  --   --   --   --   --   --  124*  --  117* 136*    < > = values in this interval not displayed.   ,   Results for orders placed or performed during the hospital encounter of 10/26/23   Chest XR,  PA & LAT    Narrative    EXAM: XR CHEST 2 VIEWS  LOCATION: [facility]  DATE: 10/26/2023    INDICATION: fever  COMPARISON: None.      Impression    IMPRESSION: Enlargement of the cardiac silhouette. Bibasilar opacities, differential includes atelectasis and developing pneumonia. Possible small bilateral pleural effusions. No pneumothorax. No acute bony abnormality.   CT Abdomen Pelvis w/o Contrast    Narrative    EXAM: CT ABDOMEN AND PELVIS WITHOUT CONTRAST  LOCATION: [facility]  DATE: 10/29/2023    INDICATION: Urinary tract infection, right-sided abdominal pain. Evaluate for stone, hydronephrosis etc.  Patient does have dementia.  COMPARISON: None.  TECHNIQUE: CT scan of the abdomen and pelvis was performed without IV contrast. Multiplanar reformats were obtained. Dose reduction techniques were used.  CONTRAST: None.    FINDINGS:   LOWER CHEST: Mild dependent probable atelectasis. Elevated right hemidiaphragm.    HEPATOBILIARY: No significant mass or bile duct dilatation. No calcified gallstones.     PANCREAS: No significant mass, duct dilatation, or inflammatory change.    SPLEEN: Normal size.    ADRENAL GLANDS: Mild thickening bilaterally.    KIDNEYS/BLADDER: No significant mass, stone, or hydronephrosis.    BOWEL: Normal.    LYMPH NODES: No adenopathy demonstrated in the absence  of contrast.    VASCULATURE: There are moderate atherosclerotic changes of the visualized aorta and its branches. There is no evidence of aortic aneurysm.    PELVIC ORGANS: Mild prostatomegaly.    MUSCULOSKELETAL: No frankly destructive bony lesions.      Impression    IMPRESSION:   1.  No hydronephrosis or urolithiasis.  2.  No acute process demonstrated.            Follow up/instructions: Patient can follow-up with primary care provider at his [facility] care going forward    Pending test results at discharge:      Unresulted Labs Ordered in the Past 30 Days of this Admission       No orders found from 9/26/2023 to 10/27/2023.             Discharge Orders      General info for SNF    Length of Stay Estimate: Long Term Care  Condition at Discharge: Stable  Level of care:board and care  Rehabilitation Potential: Fair  Admission H&P remains valid and up-to-date: Yes  Recent Chemotherapy: N/A  Use Nursing Home Standing Orders: Yes     Mantoux instructions    Give two-step Mantoux (PPD) Per Facility Policy Yes     Follow Up and recommended labs and tests    Follow up with Nursing home physician.  No follow up labs or test are needed.     Reason for your hospital stay    Dementia, in need of placement     Activity - Up with nursing assistance     No CPR- Do NOT Intubate     Fall precautions     Diet    Follow this diet upon discharge: Orders Placed This Encounter      Snacks/Supplements Adult: Gelatein Plus; With Meals      Combination Diet Regular Diet Adult       Discharge Disposition   Discharged to long-term [facility] care facility  Condition at discharge: Stable      Consultations This Hospital Stay   PHYSICAL THERAPY ADULT IP CONSULT  OCCUPATIONAL THERAPY ADULT IP CONSULT  CARE MANAGEMENT / SOCIAL WORK IP CONSULT  PHYSICAL THERAPY ADULT IP CONSULT    Code Status   No CPR- Do NOT Intubate    Time Spent on this Encounter   Cristian APONTE MD, personally saw the patient today and spent less than or equal to 30 minutes  discharging this patient.  Discussed with nursing, social work, case management           This document was created using voice recognition technology.  Please excuse any typographical errors that may have occurred.  Please call with any questions.       Cristian Oleary MD  [facility]  [address]  [facility] [address]  Phone: [phone]  Fax: [phone]  ______________________________________________________________________    Physical Exam   Vital Signs: Temp: 97.9  F (36.6  C) Temp src: Temporal BP: 114/64 Pulse: 73   Resp: 18 SpO2: 93 % O2 Device: None (Room air)    Weight: 214 lbs 1.07 oz    Exam is stable from yesterday  General: Pleasant laying in bed, is only confused, no apparent distress and pleasant, appears elderly and frail, talkative and interactive  HEENT:  MMM  LUNGS:  lungs ctab, nl effort   Cardiology: rrr no mrg   Abdomen:  belly s/nt/nd   EXTR:  ARCHER, no edema.      Discharge Medications   Current Discharge Medication List        START taking these medications    Details   miconazole (MICATIN) 2 % external powder Apply topically 2 times daily  Qty: 43 g, Refills: 0    Associated Diagnoses: Intertrigo           CONTINUE these medications which have CHANGED    Details   cetirizine (ZYRTEC) 10 MG tablet Take 1 tablet (10 mg) by mouth daily for 3 days  Qty: 3 tablet, Refills: 0    Associated Diagnoses: Seasonal allergic rhinitis, unspecified trigger      !! QUEtiapine (SEROQUEL) 25 MG tablet Take 1 tablet (25 mg) by mouth 2 times daily for 3 days  Qty: 6 tablet, Refills: 0    Associated Diagnoses: Alzheimer's disease (H)      !! QUEtiapine (SEROQUEL) 25 MG tablet Take 1 tablet (25 mg) by mouth every 12 hours as needed  Qty: 6 tablet, Refills: 0    Associated Diagnoses: Alzheimer's disease (H)      sertraline (ZOLOFT) 50 MG tablet Take 1 tablet (50 mg) by mouth daily for 3 days  Qty: 3 tablet, Refills: 0    Associated Diagnoses: Adjustment reaction with  anxiety and depression       !! - Potential duplicate medications found. Please discuss with provider.        Allergies   No Known Allergies

## 2023-11-06 NOTE — PROGRESS NOTES
Care Management Discharge Note    Discharge Date: 11/06/2023       Discharge Disposition: Assisted Living, Home Care    Discharge Services:      Discharge DME:      Discharge Transportation: other (see comments)    Private pay costs discussed: transportation costs      Education Provided on the Discharge Plan: Yes  Persons Notified of Discharge Plans: memory care and spouse  Patient/Family in Agreement with the Plan: yes    Additional Information:  Pt discharging to admit into [facility] memory care today via WC transport.  Orders sent to [phone]. Sw confirmed with [facility] discharge medications, three days bubble packed.    DISHA Flores, [facility]  Inpatient Care Coordination  [facility]  [phone]

## 2023-11-07 ENCOUNTER — PATIENT OUTREACH (OUTPATIENT)
Dept: CARE COORDINATION | Facility: CLINIC | Age: 78
End: 2023-11-07
Payer: MEDICARE

## 2023-11-07 NOTE — PROGRESS NOTES
[facility]    Background: Transitional Care Management program identified per system criteria and reviewed by [facility] team for possible outreach.    Assessment: Upon chart review, [facility] Team member will not proceed with patient outreach related to this episode of Transitional Care Management program due to reason below:    Patient has discharged to a Memory Care, Long-term Care, Assisted Living or Group Home where patient is receiving on-site support with their daily cares, including support with hospital follow up plan.    Patient discharged back to his Assisted Living Facility with Memory Care. No [facility] outreach call needed at this time.    Plan: Transitional Care Management episode addressed appropriately per reason noted above.      ROSA Hinojosa  [phone]  [facility]     *Connected Care Resource Team does NOT follow patient ongoing. Referrals are identified based on internal discharge reports and the outreach is to ensure patient has an understanding of their discharge instructions.

## 2023-11-09 ENCOUNTER — LAB REQUISITION (OUTPATIENT)
Dept: LAB | Facility: CLINIC | Age: 78
End: 2023-11-09
Payer: MEDICARE

## 2023-11-09 DIAGNOSIS — A41.9 SEPSIS, UNSPECIFIED ORGANISM (H): ICD-10-CM

## 2023-11-10 LAB
ALBUMIN SERPL BCG-MCNC: 3.7 G/DL (ref 3.5–5.2)
ALP SERPL-CCNC: 100 U/L (ref 40–129)
ALT SERPL W P-5'-P-CCNC: 35 U/L (ref 0–70)
ANION GAP SERPL CALCULATED.3IONS-SCNC: 14 MMOL/L (ref 7–15)
AST SERPL W P-5'-P-CCNC: 29 U/L (ref 0–45)
BILIRUB SERPL-MCNC: 0.5 MG/DL
BUN SERPL-MCNC: 22.9 MG/DL (ref 8–23)
CALCIUM SERPL-MCNC: 9 MG/DL (ref 8.8–10.2)
CHLORIDE SERPL-SCNC: 105 MMOL/L (ref 98–107)
CREAT SERPL-MCNC: 0.95 MG/DL (ref 0.67–1.17)
DEPRECATED HCO3 PLAS-SCNC: 19 MMOL/L (ref 22–29)
EGFRCR SERPLBLD CKD-EPI 2021: 82 ML/MIN/1.73M2
ERYTHROCYTE [DISTWIDTH] IN BLOOD BY AUTOMATED COUNT: 12.9 % (ref 10–15)
GLUCOSE SERPL-MCNC: 130 MG/DL (ref 70–99)
HCT VFR BLD AUTO: 42.2 % (ref 40–53)
HGB BLD-MCNC: 13.4 G/DL (ref 13.3–17.7)
MCH RBC QN AUTO: 30.6 PG (ref 26.5–33)
MCHC RBC AUTO-ENTMCNC: 31.8 G/DL (ref 31.5–36.5)
MCV RBC AUTO: 96 FL (ref 78–100)
PLATELET # BLD AUTO: 394 10E3/UL (ref 150–450)
POTASSIUM SERPL-SCNC: 4.9 MMOL/L (ref 3.4–5.3)
PROT SERPL-MCNC: 7.2 G/DL (ref 6.4–8.3)
RBC # BLD AUTO: 4.38 10E6/UL (ref 4.4–5.9)
SODIUM SERPL-SCNC: 138 MMOL/L (ref 135–145)
WBC # BLD AUTO: 6.2 10E3/UL (ref 4–11)

## 2023-11-10 PROCEDURE — 85027 COMPLETE CBC AUTOMATED: CPT | Mod: ORL

## 2023-11-10 PROCEDURE — 36415 COLL VENOUS BLD VENIPUNCTURE: CPT | Mod: ORL

## 2023-11-10 PROCEDURE — 80053 COMPREHEN METABOLIC PANEL: CPT | Mod: ORL

## 2023-11-10 PROCEDURE — P9604 ONE-WAY ALLOW PRORATED TRIP: HCPCS | Mod: ORL

## 2023-11-27 PROCEDURE — 81001 URINALYSIS AUTO W/SCOPE: CPT | Mod: ORL | Performed by: PHYSICIAN ASSISTANT

## 2023-11-27 PROCEDURE — 87086 URINE CULTURE/COLONY COUNT: CPT | Mod: ORL | Performed by: PHYSICIAN ASSISTANT

## 2023-11-28 ENCOUNTER — LAB REQUISITION (OUTPATIENT)
Dept: LAB | Facility: CLINIC | Age: 78
End: 2023-11-28
Payer: MEDICARE

## 2023-11-28 LAB
ALBUMIN UR-MCNC: 70 MG/DL
APPEARANCE UR: ABNORMAL
BACTERIA #/AREA URNS HPF: ABNORMAL /HPF
BILIRUB UR QL STRIP: NEGATIVE
COLOR UR AUTO: ABNORMAL
GLUCOSE UR STRIP-MCNC: NEGATIVE MG/DL
HGB UR QL STRIP: ABNORMAL
KETONES UR STRIP-MCNC: NEGATIVE MG/DL
LEUKOCYTE ESTERASE UR QL STRIP: ABNORMAL
NITRATE UR QL: POSITIVE
PH UR STRIP: 5.5 [PH] (ref 5–7)
RBC URINE: 9 /HPF
SP GR UR STRIP: 1.02 (ref 1–1.03)
UROBILINOGEN UR STRIP-MCNC: NORMAL MG/DL
WBC CLUMPS #/AREA URNS HPF: PRESENT /HPF
WBC URINE: >182 /HPF

## 2023-11-30 LAB — BACTERIA UR CULT: ABNORMAL

## 2024-02-03 ENCOUNTER — LAB REQUISITION (OUTPATIENT)
Dept: LAB | Facility: CLINIC | Age: 79
End: 2024-02-03
Payer: MEDICARE

## 2024-02-03 DIAGNOSIS — Z11.7 ENCOUNTER FOR TESTING FOR LATENT TUBERCULOSIS INFECTION: ICD-10-CM

## 2024-02-05 PROCEDURE — 86481 TB AG RESPONSE T-CELL SUSP: CPT | Mod: ORL

## 2024-02-05 PROCEDURE — 36415 COLL VENOUS BLD VENIPUNCTURE: CPT | Mod: ORL

## 2024-02-07 LAB
GAMMA INTERFERON BACKGROUND BLD IA-ACNC: 0.02 IU/ML
M TB IFN-G BLD-IMP: NEGATIVE
M TB IFN-G CD4+ BCKGRND COR BLD-ACNC: 9.98 IU/ML
MITOGEN IGNF BCKGRD COR BLD-ACNC: 0.02 IU/ML
MITOGEN IGNF BCKGRD COR BLD-ACNC: 0.03 IU/ML
QUANTIFERON MITOGEN: 10 IU/ML
QUANTIFERON NIL TUBE: 0.02 IU/ML
QUANTIFERON TB1 TUBE: 0.04 IU/ML
QUANTIFERON TB2 TUBE: 0.05

## 2025-06-24 NOTE — H&P
Department of Anesthesiology  Preprocedure Note       Name:  Sp Jimenez   Age:  80 y.o.  :  1945                                          MRN:  [id number]         Date:  2025      Surgeon: Surgeon(s):  Williams Guerrero MD    Procedure: Procedure(s):  Left Total knee arthroplasty    Medications prior to admission:   Prior to Admission medications    Medication Sig Start Date End Date Taking? Authorizing Provider   Blood Glucose Monitoring Suppl (TRUE METRIX METER) DELLA Use as directed 25  Yes Sg Maguire MD   blood glucose test strips (TRUE METRIX BLOOD GLUCOSE TEST) strip 1 each by In Vitro route 3 times daily As needed. 25  Yes Sg Maguire MD   Lancets (ONETOUCH DELICA PLUS [id number]) MISC Use as directed 25  Yes Sg Maguire MD   propranolol (INDERAL LA) 120 MG extended release capsule Take 1 capsule by mouth daily 25  Yes Sg Maguire MD   levothyroxine (EUTHYROX) 100 MCG tablet Take 1 tablet by mouth Daily 25 Yes Sg Maguire MD   hydroCHLOROthiazide 12.5 MG capsule Take 1 capsule by mouth every morning 24  Yes Sg Maguire MD   losartan (COZAAR) 50 MG tablet Take 1 tablet by mouth daily 24  Yes Sg Maguire MD   meclizine (ANTIVERT) 25 MG tablet Take 1 tablet by mouth 3 times daily as needed for Dizziness 24  Yes Sg Maguire MD   Blood Glucose Monitoring Suppl (ONETOUCH VERIO FLEX SYSTEM) w/Device KIT USE AS DIRECTED twice a day 23  Yes Harper Shaffer MD   blood glucose monitor kit and supplies Test 2 times a day   Onetouch 23  Yes Sg Maguire MD   fluocinonide (LIDEX) 0.05 % cream Apply topically 2 times daily. 25   Sg Maguire MD   Lancet Device MISC 1 Device by Does not apply route once for 1 dose 25  Sg Maguire MD   escitalopram (LEXAPRO) 20 MG tablet TAKE 1 & 1/2 (ONE & ONE-HALF) TABLETS BY MOUTH ONCE DAILY 25   Sg Maguire MD   neomycin-bacitracin-polymyxin (NEOSPORIN) 5-400-5000 ointment Apply topically 4 times daily.  [facility]    History and Physical - Hospitalist Service       Date of Admission:  10/26/2023    Assessment & Plan      Jamie Meyer is a 77 year old male with PMH signficaint for severe Alzheimer's disease with behavioral disturbance, shuffling gait & tremor, frequent falls, BPH, HLD admitted on 10/26/2023 with weakness, tremor.      Sepsis secondary to UTI, less likely PNA:  Sepsis evidenced by leukocytosis, tachypnea, fever, presence of infection.  Urinalysis grossly infected.  Spouse reports that he has had frequent urination for the recent days to weeks.  Hx of BPH with prior prostate surgery so I wonder if he is having urinary retention.  CXR showed bibasilar opacities which I suspect are related to atelectasis given sedentary behavior and lack of pulmonary symptoms.   -Ceftriaxone  -Follow up urine and blood cultures  -Recheck CBC in AM  -Monitor vitals  -Sepsis fluid volus with 2L NS bolus over 4 hours  -MIVF with LR at 100 ml/hr  -Bladder cath protocol    Generalized weakness & tremor:  At baseline does not follow directions well.  Slow shuffling gait at baseline.  Frequent falls at baseline.  Weakness worsened today to the point where spouse and nephew could not get him off the ground.  Suspect related to infection as above.  No focal neuro deficits on exam.   -Treat infection as above  -PT/OT    Alzheimers dz with behavioral disturbance  Shuffling gait and frequent falls:  Severe hx of same.  Spouse reports that he has almost no good days anymore.    -PTA seroquel once med rec completed    BPH: May be contributing to infection. Await formal med rec.   HLD: Await formal med rec        Diet:  Regular  DVT Prophylaxis: Pneumatic Compression Devices  Cornell Catheter: Not present  Lines: None     Cardiac Monitoring: None  Code Status:  DNR/DNI    Clinically Significant Risk Factors Present on Admission                    # Dementia: noted on problem list               Disposition Plan  "     Expected Discharge Date: 10/28/2023                  Carmelo Mcclure DO  Hospitalist Service  [facility]  Securely message with [employer] (more info)  Text page via [facility] Paging/Directory     ______________________________________________________________________    Chief Complaint   Weakness    History is obtained from the patient & spouse.     History of Present Illness   Jamie Meyer is a 77 year old male with PMH signficaint for severe Alzheimer's disease with behavioral disturbance, shuffling gait & tremor, frequent falls, BPH, HLD admitted on 10/26/2023 with weakness, tremor.    At home with spouse.  She found him lying by the bed, head underneath the bed.  She didn't hear him fall so she is unsure if he slid down the bed or if he had another fall.  She attempted to get him to stand but he was unable to despite her help.  She called her nephew who was also unable to get him up.  Therefore, they called EMS.  Associated symptoms include increased frequency of urination, increased tremors, and \"feeling feverish.\"    ED work-up shows /66, HR 82, Oxygen sat normal on RA.  BMP normal apart from bicarb of 20.  CBC with WBC 19.5, hgb 13, Plt 155.  Lactic acid 3.0 but normalized to 1.0.  Covid, influenza, rsv negative.  Urinalysis with large leukocyte esterase, WBC, RBC. CXR shows opacities in the bibasilar region consistent with infection or atelecatsis.     Past Medical History    See list above    Past Surgical History   Prior hernia repair, prostate surgery    Prior to Admission Medications   None        Review of Systems    The 10 point Review of Systems is negative other than noted in the HPI or here.     Social History   I have reviewed this patient's social history and updated it with pertinent information if needed.       Family History   Alzheimer's Birth Mother  Kidney Disorder Birth Father  Diabetes Birth Father     Allergies   No Known Allergies     Physical Exam   Vital " Signs: Temp: 99.4  F (37.4  C) Temp src: Oral BP: 104/66 Pulse: 82   Resp: 20 SpO2: 93 % O2 Device: None (Room air)    Weight: 0 lbs 0 oz    General Appearance: Awake and alert.  Able to answer questions and follow commands.   Respiratory: CTAB, no rales, wheezing.  Normal WOB on RA.   Cardiovascular: RRR but with frequent extra beats, no mrg  GI: Benign. Soft. No TTP.   Skin: No rashes or lesions on exposed skin.   Other: Warm extremities.      Medical Decision Making       75 MINUTES SPENT BY ME on the date of service doing chart review, history, exam, documentation & further activities per the note.      Data   ------------------------- PAST 24 HR DATA REVIEWED -----------------------------------------------    I have personally reviewed the following data over the past 24 hrs:    19.5 (H)  \   13.0 (L)   / 155     135 102 21.5 /  172 (H)   3.8 20 (L) 1.03 \     ALT: 36 AST: 104 (H) AP: 68 TBILI: 1.0   ALB: 4.0 TOT PROTEIN: 6.6 LIPASE: N/A     Procal: N/A CRP: N/A Lactic Acid: 1.0         Imaging results reviewed over the past 24 hrs:   Recent Results (from the past 24 hour(s))   Chest XR,  PA & LAT    Narrative    EXAM: XR CHEST 2 VIEWS  LOCATION: [facility]  DATE: 10/26/2023    INDICATION: fever  COMPARISON: None.      Impression    IMPRESSION: Enlargement of the cardiac silhouette. Bibasilar opacities, differential includes atelectasis and developing pneumonia. Possible small bilateral pleural effusions. No pneumothorax. No acute bony abnormality.